# Patient Record
Sex: MALE | Race: WHITE | Employment: OTHER | URBAN - METROPOLITAN AREA
[De-identification: names, ages, dates, MRNs, and addresses within clinical notes are randomized per-mention and may not be internally consistent; named-entity substitution may affect disease eponyms.]

---

## 2017-02-21 ENCOUNTER — TELEPHONE (OUTPATIENT)
Dept: FAMILY MEDICINE CLINIC | Age: 67
End: 2017-02-21

## 2017-02-21 DIAGNOSIS — Z11.59 NEED FOR HEPATITIS C SCREENING TEST: ICD-10-CM

## 2017-02-21 DIAGNOSIS — E78.00 PURE HYPERCHOLESTEROLEMIA: Primary | ICD-10-CM

## 2017-02-21 RX ORDER — ATORVASTATIN CALCIUM 20 MG/1
20 TABLET, FILM COATED ORAL DAILY
Qty: 90 TABLET | Refills: 3 | Status: SHIPPED | OUTPATIENT
Start: 2017-02-21 | End: 2017-12-19 | Stop reason: SDUPTHER

## 2017-04-12 ENCOUNTER — TELEPHONE (OUTPATIENT)
Dept: FAMILY MEDICINE CLINIC | Age: 67
End: 2017-04-12

## 2017-06-03 ENCOUNTER — HOSPITAL ENCOUNTER (OUTPATIENT)
Dept: OTHER | Age: 67
Discharge: HOME | End: 2017-06-03
Attending: FAMILY MEDICINE | Admitting: FAMILY MEDICINE

## 2017-06-03 LAB
A/G RATIO: 1.8 (ref 1.1–2.2)
ALBUMIN SERPL-MCNC: 4.3 G/DL (ref 3.4–5)
ALP BLD-CCNC: 82 U/L (ref 40–129)
ALT SERPL-CCNC: 23 U/L (ref 10–40)
ANION GAP SERPL CALCULATED.3IONS-SCNC: 16 MMOL/L (ref 3–16)
AST SERPL-CCNC: 20 U/L (ref 15–37)
BILIRUB SERPL-MCNC: 0.4 MG/DL (ref 0–1)
BUN BLDV-MCNC: 23 MG/DL (ref 7–20)
CALCIUM SERPL-MCNC: 9 MG/DL (ref 8.3–10.6)
CHLORIDE BLD-SCNC: 104 MMOL/L (ref 99–110)
CHOLESTEROL, TOTAL: 157 MG/DL (ref 0–199)
CO2: 24 MMOL/L (ref 21–32)
CREAT SERPL-MCNC: 1 MG/DL (ref 0.8–1.3)
GFR AFRICAN AMERICAN: >60
GFR NON-AFRICAN AMERICAN: >60
GLOBULIN: 2.4 G/DL
GLUCOSE BLD-MCNC: 94 MG/DL (ref 70–99)
HDLC SERPL-MCNC: 51 MG/DL (ref 40–60)
HEPATITIS C ANTIBODY INTERPRETATION: NORMAL
LDL CHOLESTEROL CALCULATED: 87 MG/DL
POTASSIUM SERPL-SCNC: 4.7 MMOL/L (ref 3.5–5.1)
SODIUM BLD-SCNC: 144 MMOL/L (ref 136–145)
TOTAL PROTEIN: 6.7 G/DL (ref 6.4–8.2)
TRIGL SERPL-MCNC: 93 MG/DL (ref 0–150)
VLDLC SERPL CALC-MCNC: 19 MG/DL

## 2017-06-14 ENCOUNTER — OFFICE VISIT (OUTPATIENT)
Dept: FAMILY MEDICINE CLINIC | Age: 67
End: 2017-06-14

## 2017-06-14 VITALS
BODY MASS INDEX: 28.17 KG/M2 | HEART RATE: 82 BPM | DIASTOLIC BLOOD PRESSURE: 80 MMHG | SYSTOLIC BLOOD PRESSURE: 122 MMHG | OXYGEN SATURATION: 97 % | HEIGHT: 72 IN | WEIGHT: 208 LBS

## 2017-06-14 DIAGNOSIS — E78.00 PURE HYPERCHOLESTEROLEMIA: ICD-10-CM

## 2017-06-14 DIAGNOSIS — Z00.00 WELL ADULT EXAM: Primary | ICD-10-CM

## 2017-06-14 PROCEDURE — G0439 PPPS, SUBSEQ VISIT: HCPCS | Performed by: FAMILY MEDICINE

## 2017-06-14 ASSESSMENT — PATIENT HEALTH QUESTIONNAIRE - PHQ9
2. FEELING DOWN, DEPRESSED OR HOPELESS: 0
SUM OF ALL RESPONSES TO PHQ9 QUESTIONS 1 & 2: 0
SUM OF ALL RESPONSES TO PHQ QUESTIONS 1-9: 0
1. LITTLE INTEREST OR PLEASURE IN DOING THINGS: 0

## 2017-10-12 ENCOUNTER — OFFICE VISIT (OUTPATIENT)
Dept: FAMILY MEDICINE CLINIC | Age: 67
End: 2017-10-12

## 2017-10-12 VITALS
HEART RATE: 61 BPM | SYSTOLIC BLOOD PRESSURE: 132 MMHG | OXYGEN SATURATION: 97 % | DIASTOLIC BLOOD PRESSURE: 84 MMHG | WEIGHT: 217 LBS | BODY MASS INDEX: 29.23 KG/M2

## 2017-10-12 DIAGNOSIS — M54.50 ACUTE LEFT-SIDED LOW BACK PAIN WITHOUT SCIATICA: Primary | ICD-10-CM

## 2017-10-12 PROCEDURE — 3017F COLORECTAL CA SCREEN DOC REV: CPT | Performed by: FAMILY MEDICINE

## 2017-10-12 PROCEDURE — 99213 OFFICE O/P EST LOW 20 MIN: CPT | Performed by: FAMILY MEDICINE

## 2017-10-12 PROCEDURE — G8427 DOCREV CUR MEDS BY ELIG CLIN: HCPCS | Performed by: FAMILY MEDICINE

## 2017-10-12 PROCEDURE — G8417 CALC BMI ABV UP PARAM F/U: HCPCS | Performed by: FAMILY MEDICINE

## 2017-10-12 PROCEDURE — 1123F ACP DISCUSS/DSCN MKR DOCD: CPT | Performed by: FAMILY MEDICINE

## 2017-10-12 PROCEDURE — 1036F TOBACCO NON-USER: CPT | Performed by: FAMILY MEDICINE

## 2017-10-12 PROCEDURE — G8484 FLU IMMUNIZE NO ADMIN: HCPCS | Performed by: FAMILY MEDICINE

## 2017-10-12 PROCEDURE — 4040F PNEUMOC VAC/ADMIN/RCVD: CPT | Performed by: FAMILY MEDICINE

## 2017-12-19 RX ORDER — ATORVASTATIN CALCIUM 20 MG/1
TABLET, FILM COATED ORAL
Qty: 90 TABLET | Refills: 2 | Status: SHIPPED | OUTPATIENT
Start: 2017-12-19 | End: 2018-10-15 | Stop reason: SDUPTHER

## 2018-05-09 ENCOUNTER — TELEPHONE (OUTPATIENT)
Dept: FAMILY MEDICINE CLINIC | Age: 68
End: 2018-05-09

## 2018-05-09 DIAGNOSIS — E78.00 PURE HYPERCHOLESTEROLEMIA: Primary | ICD-10-CM

## 2018-06-16 ENCOUNTER — HOSPITAL ENCOUNTER (OUTPATIENT)
Dept: OTHER | Age: 68
Discharge: HOME | End: 2018-06-16
Attending: FAMILY MEDICINE | Admitting: FAMILY MEDICINE

## 2018-06-16 DIAGNOSIS — E78.00 PURE HYPERCHOLESTEROLEMIA: ICD-10-CM

## 2018-06-16 LAB
A/G RATIO: 1.6 (ref 1.1–2.2)
ALBUMIN SERPL-MCNC: 4.2 G/DL (ref 3.4–5)
ALP BLD-CCNC: 88 U/L (ref 40–129)
ALT SERPL-CCNC: 22 U/L (ref 10–40)
ANION GAP SERPL CALCULATED.3IONS-SCNC: 13 MMOL/L (ref 3–16)
AST SERPL-CCNC: 21 U/L (ref 15–37)
BILIRUB SERPL-MCNC: 0.4 MG/DL (ref 0–1)
BUN BLDV-MCNC: 19 MG/DL (ref 7–20)
CALCIUM SERPL-MCNC: 9.3 MG/DL (ref 8.3–10.6)
CHLORIDE BLD-SCNC: 106 MMOL/L (ref 99–110)
CHOLESTEROL, TOTAL: 144 MG/DL (ref 0–199)
CO2: 25 MMOL/L (ref 21–32)
CREAT SERPL-MCNC: 1 MG/DL (ref 0.8–1.3)
GFR AFRICAN AMERICAN: >60
GFR NON-AFRICAN AMERICAN: >60
GLOBULIN: 2.6 G/DL
GLUCOSE BLD-MCNC: 90 MG/DL (ref 70–99)
HDLC SERPL-MCNC: 50 MG/DL (ref 40–60)
LDL CHOLESTEROL CALCULATED: 81 MG/DL
POTASSIUM SERPL-SCNC: 4.7 MMOL/L (ref 3.5–5.1)
SODIUM BLD-SCNC: 144 MMOL/L (ref 136–145)
TOTAL PROTEIN: 6.8 G/DL (ref 6.4–8.2)
TRIGL SERPL-MCNC: 64 MG/DL (ref 0–150)
VLDLC SERPL CALC-MCNC: 13 MG/DL

## 2018-06-26 ENCOUNTER — OFFICE VISIT (OUTPATIENT)
Dept: FAMILY MEDICINE CLINIC | Age: 68
End: 2018-06-26

## 2018-06-26 VITALS
HEIGHT: 73 IN | HEART RATE: 67 BPM | BODY MASS INDEX: 28.34 KG/M2 | SYSTOLIC BLOOD PRESSURE: 122 MMHG | WEIGHT: 213.8 LBS | DIASTOLIC BLOOD PRESSURE: 84 MMHG | OXYGEN SATURATION: 97 %

## 2018-06-26 DIAGNOSIS — Z00.00 WELL ADULT EXAM: Primary | ICD-10-CM

## 2018-06-26 DIAGNOSIS — E78.00 PURE HYPERCHOLESTEROLEMIA: ICD-10-CM

## 2018-06-26 PROCEDURE — 4040F PNEUMOC VAC/ADMIN/RCVD: CPT | Performed by: FAMILY MEDICINE

## 2018-06-26 PROCEDURE — G0439 PPPS, SUBSEQ VISIT: HCPCS | Performed by: FAMILY MEDICINE

## 2018-06-26 ASSESSMENT — PATIENT HEALTH QUESTIONNAIRE - PHQ9
1. LITTLE INTEREST OR PLEASURE IN DOING THINGS: 0
SUM OF ALL RESPONSES TO PHQ QUESTIONS 1-9: 0
2. FEELING DOWN, DEPRESSED OR HOPELESS: 0
SUM OF ALL RESPONSES TO PHQ9 QUESTIONS 1 & 2: 0

## 2018-06-28 ENCOUNTER — TELEPHONE (OUTPATIENT)
Dept: FAMILY MEDICINE CLINIC | Age: 68
End: 2018-06-28

## 2018-10-15 RX ORDER — ATORVASTATIN CALCIUM 20 MG/1
TABLET, FILM COATED ORAL
Qty: 90 TABLET | Refills: 2 | Status: SHIPPED | OUTPATIENT
Start: 2018-10-15 | End: 2019-02-15 | Stop reason: SDUPTHER

## 2019-01-16 ENCOUNTER — TELEPHONE (OUTPATIENT)
Dept: FAMILY MEDICINE CLINIC | Age: 69
End: 2019-01-16

## 2019-02-15 RX ORDER — ATORVASTATIN CALCIUM 20 MG/1
TABLET, FILM COATED ORAL
Qty: 90 TABLET | Refills: 1 | Status: SHIPPED | OUTPATIENT
Start: 2019-02-15 | End: 2019-09-18 | Stop reason: SDUPTHER

## 2019-07-02 ENCOUNTER — TELEPHONE (OUTPATIENT)
Dept: FAMILY MEDICINE CLINIC | Age: 69
End: 2019-07-02
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
## 2019-09-18 RX ORDER — ATORVASTATIN CALCIUM 20 MG/1
TABLET, FILM COATED ORAL
Qty: 90 TABLET | Refills: 0 | Status: SHIPPED | OUTPATIENT
Start: 2019-09-18 | End: 2020-01-17 | Stop reason: SDUPTHER

## 2019-12-16 ENCOUNTER — TELEPHONE (OUTPATIENT)
Dept: FAMILY MEDICINE CLINIC | Age: 69
End: 2019-12-16

## 2019-12-16 DIAGNOSIS — E78.00 PURE HYPERCHOLESTEROLEMIA: Primary | ICD-10-CM

## 2020-01-11 ENCOUNTER — HOSPITAL ENCOUNTER (OUTPATIENT)
Age: 70
Discharge: HOME | End: 2020-01-11
Payer: MEDICARE

## 2020-01-11 LAB
A/G RATIO: 1.5 (ref 1.1–2.2)
ALBUMIN SERPL-MCNC: 4.3 G/DL (ref 3.4–5)
ALP BLD-CCNC: 85 U/L (ref 40–129)
ALT SERPL-CCNC: 31 U/L (ref 10–40)
ANION GAP SERPL CALCULATED.3IONS-SCNC: 16 MMOL/L (ref 3–16)
AST SERPL-CCNC: 27 U/L (ref 15–37)
BILIRUB SERPL-MCNC: 0.6 MG/DL (ref 0–1)
BUN BLDV-MCNC: 25 MG/DL (ref 7–20)
CALCIUM SERPL-MCNC: 9.3 MG/DL (ref 8.3–10.6)
CHLORIDE BLD-SCNC: 105 MMOL/L (ref 99–110)
CHOLESTEROL, TOTAL: 151 MG/DL (ref 0–199)
CO2: 23 MMOL/L (ref 21–32)
CREAT SERPL-MCNC: 1.3 MG/DL (ref 0.8–1.3)
GFR AFRICAN AMERICAN: >60
GFR NON-AFRICAN AMERICAN: 55
GLOBULIN: 2.8 G/DL
GLUCOSE BLD-MCNC: 91 MG/DL (ref 70–99)
HDLC SERPL-MCNC: 55 MG/DL (ref 40–60)
LDL CHOLESTEROL CALCULATED: 83 MG/DL
POTASSIUM SERPL-SCNC: 4.6 MMOL/L (ref 3.5–5.1)
SODIUM BLD-SCNC: 144 MMOL/L (ref 136–145)
TOTAL PROTEIN: 7.1 G/DL (ref 6.4–8.2)
TRIGL SERPL-MCNC: 64 MG/DL (ref 0–150)
VLDLC SERPL CALC-MCNC: 13 MG/DL

## 2020-01-11 PROCEDURE — 80053 COMPREHEN METABOLIC PANEL: CPT

## 2020-01-11 PROCEDURE — 36415 COLL VENOUS BLD VENIPUNCTURE: CPT

## 2020-01-11 PROCEDURE — 80061 LIPID PANEL: CPT

## 2020-01-17 ENCOUNTER — OFFICE VISIT (OUTPATIENT)
Dept: FAMILY MEDICINE CLINIC | Age: 70
End: 2020-01-17
Payer: MEDICARE

## 2020-01-17 VITALS
HEIGHT: 72 IN | BODY MASS INDEX: 29.12 KG/M2 | HEART RATE: 72 BPM | SYSTOLIC BLOOD PRESSURE: 118 MMHG | OXYGEN SATURATION: 98 % | WEIGHT: 215 LBS | DIASTOLIC BLOOD PRESSURE: 72 MMHG

## 2020-01-17 PROCEDURE — 3017F COLORECTAL CA SCREEN DOC REV: CPT | Performed by: FAMILY MEDICINE

## 2020-01-17 PROCEDURE — G0439 PPPS, SUBSEQ VISIT: HCPCS | Performed by: FAMILY MEDICINE

## 2020-01-17 PROCEDURE — 1123F ACP DISCUSS/DSCN MKR DOCD: CPT | Performed by: FAMILY MEDICINE

## 2020-01-17 PROCEDURE — G8482 FLU IMMUNIZE ORDER/ADMIN: HCPCS | Performed by: FAMILY MEDICINE

## 2020-01-17 PROCEDURE — 4040F PNEUMOC VAC/ADMIN/RCVD: CPT | Performed by: FAMILY MEDICINE

## 2020-01-17 RX ORDER — ATORVASTATIN CALCIUM 20 MG/1
TABLET, FILM COATED ORAL
Qty: 90 TABLET | Refills: 3 | Status: SHIPPED | OUTPATIENT
Start: 2020-01-17 | End: 2020-10-26

## 2020-01-17 SDOH — HEALTH STABILITY: MENTAL HEALTH: HOW OFTEN DO YOU HAVE A DRINK CONTAINING ALCOHOL?: MONTHLY OR LESS

## 2020-01-17 SDOH — HEALTH STABILITY: MENTAL HEALTH: HOW MANY STANDARD DRINKS CONTAINING ALCOHOL DO YOU HAVE ON A TYPICAL DAY?: 1 OR 2

## 2020-01-17 ASSESSMENT — PATIENT HEALTH QUESTIONNAIRE - PHQ9
SUM OF ALL RESPONSES TO PHQ QUESTIONS 1-9: 0
SUM OF ALL RESPONSES TO PHQ QUESTIONS 1-9: 0

## 2020-01-17 NOTE — PROGRESS NOTES
[facility] VISIT    Patient is here for their Medicare Annual Wellness Visit. Last eye exam: utd  Last dental exam: no  Exercise: walk every day about 1 mile and active through the day at work. Fall Risk 1/17/2020 6/26/2018 6/14/2017 4/27/2015   2 or more falls in past year? no no no no   Fall with injury in past year? no no no no       PHQ Scores 1/17/2020 6/26/2018 6/14/2017 4/27/2015   PHQ2 Score 0 0 0 0   PHQ9 Score 0 0 0 0         Have you noted any problems with hearing?: No  Have you noted any vision problems?: No    Do you take opioid medications even sometimes? No (if using assess risk and whether other treatments would be beneficial)    Living Will: Yes,   Copy requested    Do you need help with:  Using the phone:  No  Bathing: No  Dressing:  No  Toileting: No  Transportation:  No  Shopping: No  Preparing meals: No  Housework/Laundry: No  Medications: No  Money management: No    Does your home have:  Unsecured throw rugs: No  Grab bars in bathroom: Yes  Walk in shower: Yes  Seat in shower: Yes  Lit pathways for night (nightlights): Yes    Memory:  Have you or anyone close to you expressed concerns about your memory: No, working on application developments for computer programming. Knows:  Month: Yes  Day: Yes  Year: Yes  Day of Week: Yes  Able to Recall (apple, boat, jose) : Yes    Patient history:   Patient's medications, allergies, past medical, surgical, social and family histories were reviewed and updated in the EHR. Care Team:  Patient's list of care team members was updated in EHR. Immunizations: up to date and documented    Health Maintenance Due   Topic Date Due    Annual Wellness Visit (AWV)  06/20/2019    DTaP/Tdap/Td vaccine (2 - Td) 07/17/2019       CHRONIC CONDITIONS   Pt has been following Dr. Judy Weathers Dermatologist, was last seen in 2018 for mole removal on the left foot. Physical Exam:    Body mass index is 29.36 kg/m².   Vitals:    01/17/20 0848   BP: 118/72   Site: Left Upper Arm   Position: Sitting   Cuff Size: Medium Adult   Pulse: 72   SpO2: 98%   Weight: 215 lb (97.5 kg)   Height: 5' 11.75\" (1.822 m)     Wt Readings from Last 3 Encounters:   01/17/20 215 lb (97.5 kg)   06/26/18 213 lb 12.8 oz (97 kg)   10/12/17 217 lb (98.4 kg)       GENERAL:Alert and oriented x 4 NAD, affect appropriate and overweight, well hydrated, well developed. NECK:supple and non tender without mass, no thyromegaly or thyroid nodules, no cervical lymphadenopathy  LUNG:clear to auscultation bilaterally with normal respiratory effort  CV: Normal heart sounds, regular rate and rhythm without murmurs  EXTREMETY: no loss of hair, no edema, normal pedal pulses bilaterally    Was the timed get up and go unsteady or longer than 30 seconds: No    Vision Screen for Initial Exam: no    EKG for Initial Exam at 71 (): no    AAA U/S screen for men 65-75 who smoked (): not applicable    Assessment/Plan:    Don Allison was seen today for medicare awv. Diagnoses and all orders for this visit:    Well adult exam  Recommended screenings discussed and ordered if patient agreed  Recommended vaccinations discussed and ordered if patient agreed  Encouraged healthy diet   Encouraged regular exercise and maintaining a healthy weight  Medicare Safety Interventions: Home safety tips provided  Individualized [address] [place] [address] Avenue included in patient instructions and AVS    Pure hypercholesterolemia  -Stable, continue current medications. Reviewed recent labs with patient. CKD (chronic kidney disease) stage 3, GFR 30-59 ml/min ([facility])  -     Basic Metabolic Panel; Future  Slight decline  Closer f/u   Recheck 3 months    Other orders  -     atorvastatin (LIPITOR) 20 MG tablet; TAKE 1 TABLET DAILY            Return in about 1 year (around 1/17/2021) for AMW  30 min.              Scribe attestation: Padmini HAWKINS, am scribing for and in the presence of Marisol Underwood MD. Electronically signed by DESIRE Valentine on 1/17/20 at 9:12 AM    Note per DESIRE Valentine and Scribe with corrections and edits per Vangie Woods MD.  I agree with entirety of note and was present and performed history and physical.  I also confirm that the note above accurately reflects all work, treatment, procedures, and medical decision making performed by me, Vangie Woods MD

## 2020-01-17 NOTE — PATIENT INSTRUCTIONS
Tdap (tetanus/ pertussis) vaccine. Well Visit, Over 72: Care Instructions  Your Care Instructions    Physical exams can help you stay healthy. Your doctor has checked your overall health and may have suggested ways to take good care of yourself. He or she also may have recommended tests. At home, you can help prevent illness with healthy eating, regular exercise, and other steps. Follow-up care is a key part of your treatment and safety. Be sure to make and go to all appointments, and call your doctor if you are having problems. It's also a good idea to know your test results and keep a list of the medicines you take. How can you care for yourself at home? · Reach and stay at a healthy weight. This will lower your risk for many problems, such as obesity, diabetes, heart disease, and high blood pressure. · Get at least 30 minutes of exercise on most days of the week. Walking is a good choice. You also may want to do other activities, such as running, swimming, cycling, or playing tennis or team sports. · Do not smoke. Smoking can make health problems worse. If you need help quitting, talk to your doctor about stop-smoking programs and medicines. These can increase your chances of quitting for good. · Protect your skin from too much sun. When you're outdoors from 10 a.m. to 4 p.m., stay in the shade or cover up with clothing and a hat with a wide brim. Wear sunglasses that block UV rays. Even when it's cloudy, put broad-spectrum sunscreen (SPF 30 or higher) on any exposed skin. · See a dentist one or two times a year for checkups and to have your teeth cleaned. · Wear a seat belt in the car. Follow your doctor's advice about when to have certain tests. These tests can spot problems early. For men and women  · Cholesterol. Your doctor will tell you how often to have this done based on your overall health and other things that can increase your risk for heart attack and stroke. · Blood pressure.  Have lower body to the wall. Push body away from wall to return to starting position. Chair Squats  Begin by sitting in the chair. Lean slightly forward and stand up from the chair. Try not to favor one side or use your hands to help you. Bicep Curls  Hold a weight in each hand with your arms at your sides. Bending your arms at the elbows, lift the weights to your shoulders and then lower them to your sides. Shoulder Shrugs  Hold a weight in each hand with your arms at your side. Shrug your shoulders up toward your ears and then lower them back down. Citations  Promoting and Prescribing Exercise for the Elderly by Sally Julian M.D., and Angel Benavidez, Ph.D.(02/01/02, http://www. aafp.org/afp/03673278/419.html)    Last Updated: January 2011  This article was contributed by: familydoctor. org editorial staff  Copyright © American Academy [employer]  This information provides a general overview and may not apply to everyone. Talk to your family doctor to find out if this information applies to you and to get more information on this subject. Advance Directives, DNR, and MOLST    Decision making at the end of life is difficult for patients, families and health care providers. Since the early 1990s, a number of forms have been developed to help people express their wishes in advance. What are \"advance directives\"? Advance directives are documents that can help you remain in charge of your health care even after you can no longer make decisions for yourself. The two most common forms of written advance directives are the living will and durable power of  for healthcare. Some people seek an s services to complete these documents; however this is not required. You can complete these documents yourself and have them either notarized or witnessed by two people who are over 25 and not related to you by blood or marriage. What is a \"living will\"?      A living will is a document that tells authorized prescriber. It addresses the various methods used to revive people whose hearts have stopped beating /or who have stopped breathing. If a person has a [facility] order, he will receive care that eases pain and suffering but no cardio-pulmonary resuscitation (CPR) to save or prolong life. The [facility] becomes active as soon as it is signed by the doctor or advanced practice nurse. The [facility] is a standard form which can be obtained from the [address] [facility] or healthcare facilities. What is DNR Comfort Care - Arrest (a.k.a. [address])? The [address] is similar to the [facility] but it only becomes active if and when the person has a cardiac and/or respiratory arrest (i.e. the person stops breathing or his heart stops beating). The [address] is a standard form which can be obtained from the [address] [facility] or healthcare facilities. What is a MOLST? MOLST stands for Medical Orders for Life Sustaining Treatment. Nanette Falling is a medical order which specifies different treatment options for individuals who are seriously ill or frail and elderly. The MOLST allows patients or their surrogate to discuss care options they do and do not want to receive at the end of life, and then have their physician or other prescriber convey them into orders. This form would be available through [address] and healthcare facilities. Who is at high risk of falling? Anyone can fall, although the risk is higher in older people. This increased risk of falling may be the result of changes that come with aging, and certain medical conditions, such as arthritis, cataracts or hip problems. What can I do to lower my risk of falling? Most falls happen in the home. Consider the following tips to make your home safe:  -Make sure that you have good lighting in your home. A well lit home will help you avoid tripping over objects that are not easy to see.  Put night lights may be the result of changes that come with aging, and certain medical conditions, such as arthritis, cataracts or hip problems. What can I do to lower my risk of falling? Most falls happen in the home. Consider the following tips to make your home safe:  -Make sure that you have good lighting in your home. A well lit home will help you avoid tripping over objects that are not easy to see. Put night lights in your bedroom, hallways, stairs and bathrooms.  -Rugs should be firmly fastened to the floor or have nonskid backing. Loose ends should be tacked down.  -Electrical cords should not be lying on the floor in walking areas.  -Put hand rails in your bathroom for bath, shower and toilet use. -Have rails on both sides of your stairs for support.  -In the kitchen, make sure items are within easy reach. Dont store things too high or too low. Then you wont have to use a stepladder or a stool to reach them. Its also a good idea to avoid storing things too low, so you wont have to bend down to get them.  -Wear shoes with firm nonskid soles. Avoid wearing loose-fitting slippers that could cause you to trip. What else can I do? Take good care of your body. Try to stay healthy by following these tips:  -See your eye doctor once a year. Cataracts and other eye diseases that cause you not to see well, can lead to falls. -Get regular physical activity to keep your bones and muscles strong.  -Take good care of your feet. If you have pain in your feet or if you have large, thick nails and corns, have your doctor look at your feet. -Talk to your doctor about any side effects you may have from your medicines. Problems caused by side effects from medicine are a common cause of falls. The more medicines you take, the greater your risk of falling.  -Talk to your doctor if you have dizzy spells.  -If your doctor suggests that you use a cane or a walker to help you walk, be sure to use it.  This will give you extra

## 2020-04-03 DIAGNOSIS — N18.30 CKD (CHRONIC KIDNEY DISEASE) STAGE 3, GFR 30-59 ML/MIN (HCC): ICD-10-CM

## 2020-04-03 LAB
ANION GAP SERPL CALCULATED.3IONS-SCNC: 13 MMOL/L (ref 3–16)
BUN BLDV-MCNC: 15 MG/DL (ref 7–20)
CALCIUM SERPL-MCNC: 9.1 MG/DL (ref 8.3–10.6)
CHLORIDE BLD-SCNC: 102 MMOL/L (ref 99–110)
CO2: 27 MMOL/L (ref 21–32)
CREAT SERPL-MCNC: 1.1 MG/DL (ref 0.8–1.3)
GFR AFRICAN AMERICAN: >60
GFR NON-AFRICAN AMERICAN: >60
GLUCOSE BLD-MCNC: 85 MG/DL (ref 70–99)
POTASSIUM SERPL-SCNC: 4.6 MMOL/L (ref 3.5–5.1)
SODIUM BLD-SCNC: 142 MMOL/L (ref 136–145)

## 2020-10-26 RX ORDER — ATORVASTATIN CALCIUM 20 MG/1
TABLET, FILM COATED ORAL
Qty: 90 TABLET | Refills: 1 | Status: SHIPPED | OUTPATIENT
Start: 2020-10-26 | End: 2021-01-25 | Stop reason: SDUPTHER

## 2021-01-25 RX ORDER — ATORVASTATIN CALCIUM 20 MG/1
20 TABLET, FILM COATED ORAL DAILY
Qty: 90 TABLET | Refills: 1 | Status: SHIPPED | OUTPATIENT
Start: 2021-01-25 | End: 2021-04-15 | Stop reason: SDUPTHER

## 2021-01-25 NOTE — TELEPHONE ENCOUNTER
Medication:   Requested Prescriptions      No prescriptions requested or ordered in this encounter          Patient Phone Number: [phone] (home) [phone] (work)    Last appt: 1/17/2020   Next appt: Visit date not found    Last OARRS: No flowsheet data found.   PDMP Monitoring:    Last PDMP [facility] as Reviewed [facility]):  Review User Review Instant Review Result          Preferred Pharmacy:   [place] [address], [address] [place] [phone] -  [phone]  [place] [address]  Phone: [phone] Fax: [phone]

## 2021-02-05 ENCOUNTER — TELEPHONE (OUTPATIENT)
Dept: ADMINISTRATIVE | Age: 71
End: 2021-02-05

## 2021-03-09 ENCOUNTER — TELEPHONE (OUTPATIENT)
Dept: FAMILY MEDICINE CLINIC | Age: 71
End: 2021-03-09

## 2021-03-09 DIAGNOSIS — E78.00 PURE HYPERCHOLESTEROLEMIA: Primary | ICD-10-CM

## 2021-03-09 NOTE — TELEPHONE ENCOUNTER
----- Message from Melvin Bonilla sent at 3/9/2021  3:48 PM EST -----  Subject: Message to Provider    QUESTIONS  Information for Provider? Patient has appointment 4/15/21 with Dr Steph Grubbs on   4/15 and wants to have his labs done prior to   please advise.   ---------------------------------------------------------------------------  --------------  CALL BACK INFO  What is the best way for the office to contact you? OK to leave message on   voicemail  Preferred Call Back Phone Number? [phone]  ---------------------------------------------------------------------------  --------------  SCRIPT ANSWERS  Relationship to Patient?  Self

## 2021-03-12 ENCOUNTER — TELEPHONE (OUTPATIENT)
Dept: FAMILY MEDICINE CLINIC | Age: 71
End: 2021-03-12

## 2021-03-12 NOTE — TELEPHONE ENCOUNTER
Patient wants to know if Dr Maurice Cullen would like him to do a cologuard before his appt on 4/15.       Please advise

## 2021-04-03 ENCOUNTER — HOSPITAL ENCOUNTER (OUTPATIENT)
Age: 71
Discharge: HOME | End: 2021-04-03
Payer: MEDICARE

## 2021-04-03 DIAGNOSIS — E78.00 PURE HYPERCHOLESTEROLEMIA: ICD-10-CM

## 2021-04-03 LAB
A/G RATIO: 1.4 (ref 1.1–2.2)
ALBUMIN SERPL-MCNC: 4.2 G/DL (ref 3.4–5)
ALP BLD-CCNC: 105 U/L (ref 40–129)
ALT SERPL-CCNC: 20 U/L (ref 10–40)
ANION GAP SERPL CALCULATED.3IONS-SCNC: 9 MMOL/L (ref 3–16)
AST SERPL-CCNC: 21 U/L (ref 15–37)
BILIRUB SERPL-MCNC: 0.4 MG/DL (ref 0–1)
BUN BLDV-MCNC: 17 MG/DL (ref 7–20)
CALCIUM SERPL-MCNC: 9.3 MG/DL (ref 8.3–10.6)
CHLORIDE BLD-SCNC: 104 MMOL/L (ref 99–110)
CHOLESTEROL, TOTAL: 155 MG/DL (ref 0–199)
CO2: 28 MMOL/L (ref 21–32)
CREAT SERPL-MCNC: 1.2 MG/DL (ref 0.8–1.3)
GFR AFRICAN AMERICAN: >60
GFR NON-AFRICAN AMERICAN: 60
GLOBULIN: 2.9 G/DL
GLUCOSE BLD-MCNC: 93 MG/DL (ref 70–99)
HDLC SERPL-MCNC: 49 MG/DL (ref 40–60)
LDL CHOLESTEROL CALCULATED: 94 MG/DL
POTASSIUM SERPL-SCNC: 4.6 MMOL/L (ref 3.5–5.1)
SODIUM BLD-SCNC: 141 MMOL/L (ref 136–145)
TOTAL PROTEIN: 7.1 G/DL (ref 6.4–8.2)
TRIGL SERPL-MCNC: 62 MG/DL (ref 0–150)
VLDLC SERPL CALC-MCNC: 12 MG/DL

## 2021-04-03 PROCEDURE — 80061 LIPID PANEL: CPT

## 2021-04-03 PROCEDURE — 36415 COLL VENOUS BLD VENIPUNCTURE: CPT

## 2021-04-03 PROCEDURE — 80053 COMPREHEN METABOLIC PANEL: CPT

## 2021-04-15 ENCOUNTER — OFFICE VISIT (OUTPATIENT)
Dept: FAMILY MEDICINE CLINIC | Age: 71
End: 2021-04-15
Payer: MEDICARE

## 2021-04-15 VITALS
DIASTOLIC BLOOD PRESSURE: 84 MMHG | BODY MASS INDEX: 30.09 KG/M2 | WEIGHT: 227 LBS | HEIGHT: 73 IN | SYSTOLIC BLOOD PRESSURE: 118 MMHG | OXYGEN SATURATION: 97 % | HEART RATE: 83 BPM

## 2021-04-15 DIAGNOSIS — Z00.00 WELL ADULT EXAM: Primary | ICD-10-CM

## 2021-04-15 DIAGNOSIS — E78.00 PURE HYPERCHOLESTEROLEMIA: ICD-10-CM

## 2021-04-15 DIAGNOSIS — H61.22 IMPACTED CERUMEN OF LEFT EAR: ICD-10-CM

## 2021-04-15 DIAGNOSIS — Z12.11 SCREEN FOR COLON CANCER: ICD-10-CM

## 2021-04-15 PROCEDURE — 1123F ACP DISCUSS/DSCN MKR DOCD: CPT | Performed by: FAMILY MEDICINE

## 2021-04-15 PROCEDURE — G0439 PPPS, SUBSEQ VISIT: HCPCS | Performed by: FAMILY MEDICINE

## 2021-04-15 PROCEDURE — 3017F COLORECTAL CA SCREEN DOC REV: CPT | Performed by: FAMILY MEDICINE

## 2021-04-15 PROCEDURE — 90471 IMMUNIZATION ADMIN: CPT | Performed by: FAMILY MEDICINE

## 2021-04-15 PROCEDURE — 90715 TDAP VACCINE 7 YRS/> IM: CPT | Performed by: FAMILY MEDICINE

## 2021-04-15 PROCEDURE — 4040F PNEUMOC VAC/ADMIN/RCVD: CPT | Performed by: FAMILY MEDICINE

## 2021-04-15 RX ORDER — ATORVASTATIN CALCIUM 20 MG/1
20 TABLET, FILM COATED ORAL DAILY
Qty: 90 TABLET | Refills: 3 | Status: SHIPPED | OUTPATIENT
Start: 2021-04-15 | End: 2022-04-19 | Stop reason: SDUPTHER

## 2021-04-15 RX ORDER — ATORVASTATIN CALCIUM 20 MG/1
20 TABLET, FILM COATED ORAL DAILY
Qty: 90 TABLET | Refills: 3 | Status: SHIPPED | OUTPATIENT
Start: 2021-04-15 | End: 2021-04-15 | Stop reason: SDUPTHER

## 2021-04-15 ASSESSMENT — PATIENT HEALTH QUESTIONNAIRE - PHQ9
1. LITTLE INTEREST OR PLEASURE IN DOING THINGS: 0
SUM OF ALL RESPONSES TO PHQ QUESTIONS 1-9: 0

## 2021-04-15 NOTE — PATIENT INSTRUCTIONS
Patient Education   Please call the office noted below to schedule an appointment for colonoscopy    [facility] and [address]  [address] [facility], [address], [address]  Phone: [phone]     Well Visit, Over 72: Care Instructions  Overview     Well visits can help you stay healthy. Your doctor has checked your overall health and may have suggested ways to take good care of yourself. Your doctor also may have recommended tests. At home, you can help prevent illness with healthy eating, regular exercise, and other steps. Follow-up care is a key part of your treatment and safety. Be sure to make and go to all appointments, and call your doctor if you are having problems. It's also a good idea to know your test results and keep a list of the medicines you take. How can you care for yourself at home? · Get screening tests that you and your doctor decide on. Screening helps find diseases before any symptoms appear. · Eat healthy foods. Choose fruits, vegetables, whole grains, protein, and low-fat dairy foods. Limit fat, especially saturated fat. Reduce salt in your diet. · Limit alcohol. If you are a man, have no more than 2 drinks a day or 14 drinks a week. If you are a woman, have no more than 1 drink a day or 7 drinks a week. Since alcohol affects older adults differently, you may want to limit alcohol even more. Or you may not want to drink at all. · Get at least 30 minutes of exercise on most days of the week. Walking is a good choice. You also may want to do other activities, such as running, swimming, cycling, or playing tennis or team sports. · Reach and stay at a healthy weight. This will lower your risk for many problems, such as obesity, diabetes, heart disease, and high blood pressure. · Do not smoke. Smoking can make health problems worse. If you need help quitting, talk to your doctor about stop-smoking programs and medicines.  These can increase your chances of quitting for good. · Care for your mental health. It is easy to get weighed down by worry and stress. Learn strategies to manage stress, like deep breathing and mindfulness, and stay connected with your family and community. If you find you often feel sad or hopeless, talk with your doctor. Treatment can help. · Talk to your doctor about whether you have any risk factors for sexually transmitted infections (STIs). You can help prevent STIs if you wait to have sex with a new partner (or partners) until you've each been tested for STIs. It also helps if you use condoms (male or female condoms) and if you limit your sex partners to one person who only has sex with you. Vaccines are available for some STIs. · If you think you may have a problem with alcohol or drug use, talk to your doctor. This includes prescription medicines (such as amphetamines and opioids) and illegal drugs (such as cocaine and methamphetamine). Your doctor can help you figure out what type of treatment is best for you. · Protect your skin from too much sun. When you're outdoors from 10 a.m. to 4 p.m., stay in the shade or cover up with clothing and a hat with a wide brim. Wear sunglasses that block UV rays. Even when it's cloudy, put broad-spectrum sunscreen (SPF 30 or higher) on any exposed skin. · See a dentist one or two times a year for checkups and to have your teeth cleaned. · Wear a seat belt in the car. When should you call for help? Watch closely for changes in your health, and be sure to contact your doctor if you have any problems or symptoms that concern you. Where can you learn more? Go to [email or web address] org and sign in to your [employer] account. Enter G560 in the Cyrba box to learn more about \"Well Visit, Over 65: Care Instructions. \"     If you do not have an account, please click on the \"Sign Up Now\" link.   Current as of: May 27, 2020               Content Version: 12.8  © 5260-3407 Healthwise, Incorporated. Care instructions adapted under license by [facility] ([facility]). If you have questions about a medical condition or this instruction, always ask your healthcare professional. [address] any warranty or liability for your use of this information. Exercise and Seniors  Is it safe for me to exercise? It is safe for most adults older than 72years of age to exercise. Even patients who have chronic illnesses such as heart disease, high blood pressure, diabetes and arthritis can exercise safely. Many of these conditions are improved with exercise. If you are not sure if exercise is safe for you or if you are currently inactive, ask your doctor. How do I get started? It is important to wear loose, comfortable clothing and well-fitting, sturdy shoes. Your shoes should have a good arch support, and an elevated and cushioned heel to absorb shock. If you are not already active, begin slowly. Start with exercises that you are already comfortable doing. Starting slowly makes it less likely that you will injure yourself. Starting slowly also helps prevent soreness. The saying no pain, no gain is not true for older or elderly adults. You do not have to exercise at a high intensity to get most health benefits. For example, walking is an excellent activity to start with. As you become used to exercising, or if you are already active, you can slowly increase the intensity of your exercise program.    What type of exercise should I do? There are several types of exercise that you should do. You will want to do some type of aerobic activity for at least 30 minutes on most days of the week. Examples are walking, swimming and bicycling. You should also do resistance (also called strength training) 2 days per week. Warm up for 5 minutes before each exercise session. Walking slowly and then stretching are good warm-up activities.  You should also cool down with more stretching for 5 minutes when you finish exercising. Cool down longer in warmer weather. Exercise is only good for you if you are feeling well. Wait to exercise until you feel better if you have a cold, the flu or another illness. If you miss exercise for more  than 2 weeks, be sure to start slowly again. When should I call my doctor? If your muscles or joints are sore the day after exercising, you may have done too much. Next time, exercise at a lower intensity. If the pain or discomfort persists, you should talk to your doctor. You should also talk to your doctor if you have any of the following symptoms while exercising:  -Chest pain or pressure  -Trouble breathing or excessive shortness of breath  -lightheadedness or dizziness  -difficulty with balance  -nausea    What are some specific exercises I can do? The following shows some simple strength exercises that you can do at home. Each exercise should be done 8 to 10 times for 2 sets. Remember to:   -Complete all movements in a slow, controlled fashion  -Dont hold your breath  -Stop if you feel pain   -Stretch each muscle after your workout. Wall Pushups  Place hands flat against the wall. Slowly lower body to the wall. Push body away from wall to return to starting position. Chair Squats  Begin by sitting in the chair. Lean slightly forward and stand up from the chair. Try not to favor one side or use your hands to help you. Bicep Curls  Hold a weight in each hand with your arms at your sides. Bending your arms at the elbows, lift the weights to your shoulders and then lower them to your sides. Shoulder Shrugs  Hold a weight in each hand with your arms at your side. Shrug your shoulders up toward your ears and then lower them back down. Citations  Promoting and Prescribing Exercise for the Elderly by Niurka Roldan M.D., and Antoni Altamirano, Ph.D.(02/01/02, http://www. aafp.org/afp/[email or web address].html)    Last Updated: January 2011  This article was contributed by: [email or web address]. org editorial staff  Copyright © American Academy of [employer]icians  This information provides a general overview and may not apply to everyone. Talk to your family doctor to find out if this information applies to you and to get more information on this subject. Patient Education        Preventing Falls: Care Instructions  Your Care Instructions     Getting around your home safely can be a challenge if you have injuries or health problems that make it easy for you to fall. Loose rugs and furniture in walkways are among the dangers for many older people who have problems walking or who have poor eyesight. People who have conditions such as arthritis, osteoporosis, or dementia also have to be careful not to fall. You can make your home safer with a few simple measures. Follow-up care is a key part of your treatment and safety. Be sure to make and go to all appointments, and call your doctor if you are having problems. It's also a good idea to know your test results and keep a list of the medicines you take. How can you care for yourself at home? Taking care of yourself  · You may get dizzy if you do not drink enough water. To prevent dehydration, drink plenty of fluids. Choose water and other caffeine-free clear liquids. If you have kidney, heart, or liver disease and have to limit fluids, talk with your doctor before you increase the amount of fluids you drink. · Exercise regularly to improve your strength, muscle tone, and balance. Walk if you can. Swimming may be a good choice if you cannot walk easily. · Have your vision and hearing checked each year or any time you notice a change. If you have trouble seeing and hearing, you might not be able to avoid objects and could lose your balance. · Know the side effects of the medicines you take. Ask your doctor or pharmacist whether the medicines you take can affect your balance. Sleeping pills or sedatives can affect your balance.   · Limit the amount of alcohol you drink. Alcohol can impair your balance and other senses. · Ask your doctor whether calluses or corns on your feet need to be removed. If you wear loose-fitting shoes because of calluses or corns, you can lose your balance and fall. · Talk to your doctor if you have numbness in your feet. Preventing falls at home  · Remove raised doorway thresholds, throw rugs, and clutter. Repair loose carpet or raised areas in the floor. · Move furniture and electrical cords to keep them out of walking paths. · Use nonskid floor wax, and wipe up spills right away, especially on ceramic tile floors. · If you use a walker or cane, put rubber tips on it. If you use crutches, clean the bottoms of them regularly with an abrasive pad, such as steel wool. · Keep your house well lit, especially [place], and outside walkways. Use night-lights in areas such as hallways and bathrooms. Add extra light switches or use remote switches (such as switches that go on or off when you clap your hands) to make it easier to turn lights on if you have to get up during the night. · Install sturdy handrails on stairways. · Move items in your cabinets so that the things you use a lot are on the lower shelves (about waist level). · Keep a cordless phone and a flashlight with new batteries by your bed. If possible, put a phone in each of the main rooms of your house, or carry a cell phone in case you fall and cannot reach a phone. Or, you can wear a device around your neck or wrist. You push a button that sends a signal for help. · Wear low-heeled shoes that fit well and give your feet good support. Use footwear with nonskid soles. Check the heels and soles of your shoes for wear. Repair or replace worn heels or soles. · Do not wear socks without shoes on wood floors. · Walk on the grass when the sidewalks are slippery.  If you live in an area that gets snow and ice in the winter, sprinkle salt on slippery steps decisions for yourself. The two most common forms of written advance directives are the living will and durable power of  for healthcare. Some people seek an s services to complete these documents; however this is not required. You can complete these documents yourself and have them either notarized or witnessed by two people who are over 25 and not related to you by blood or marriage. What is a \"living will\"? A living will is a document that tells your doctor how you want to be treated if when you are determined to be terminally ill or permanently unconscious and you cannot make decisions for yourself. You can use a living will if you want to avoid life-prolonging treatments such as cardiopulmonary resuscitation (CPR), kidney dialysis or breathing machines. You can use your living will to tell your doctor that you just want to be pain free at the end of our life. In [place], the living will is sometimes called a \"declaration\". A living will form can be obtained from attorneys, [employer], and healthcare facilities. This signed form must be notarized or witnessed. What is a \"durable power of  for healthcare\"? A medical power of  (medical POA) is another type of advance directive that allows you to name a person to make health care decisions for you if and when you become unable to make them for yourself. The person you name to make decisions on your behalf is some times called your health care surrogate, agent, proxy or -in-fact. The person who holds your medical POA can respond to medical situations you might not have anticipated and make decisions for you empowered by knowledge of your values and wishes. The medical POA form can be obtained from [employer], [employer], and healthcare facilities. This signed form must be notarized or witnessed.  The medical POA document is different from the power of  form that authorizes someone to make financial transactions for you. What is cardiopulmonary resuscitation (CPR)? CPR is a technique useful in many emergencies, including heart attack or near drowning, in which someone's breathing or heartbeat has stopped. CPR may include chest compression, mouth-to-mouth or other rescue breathing and/or electric shock. What is a DNR -Comfort Care form (a.k.a. [facility])? DNR means do not resuscitate. A DNR is a medical order given by a physician or other legally authorized prescriber. It addresses the various methods used to revive people whose hearts have stopped beating /or who have stopped breathing. If a person has a [facility] order, he will receive care that eases pain and suffering but no cardio-pulmonary resuscitation (CPR) to save or prolong life. The [facility] becomes active as soon as it is signed by the doctor or advanced practice nurse. The [facility] is a standard form which can be obtained from the [address] [facility] or healthcare facilities. What is DNR Comfort Care - Arrest (a.k.a. [address])? The [address] is similar to the [facility] but it only becomes active if and when the person has a cardiac and/or respiratory arrest (i.e. the person stops breathing or his heart stops beating). The [address] is a standard form which can be obtained from the [address] [facility] or healthcare facilities. What is a MOLST? MOLST stands for Medical Orders for Life Sustaining Treatment. Nila Cunninghamel is a medical order which specifies different treatment options for individuals who are seriously ill or frail and elderly. The MOLST allows patients or their surrogate to discuss care options they do and do not want to receive at the end of life, and then have their physician or other prescriber convey them into orders. This form would be available through [address] and healthcare facilities.                  Patient Education        Tdap (Tetanus, Diphtheria, Pertussis) Vaccine: What You Need to Know  Why get vaccinated? Tdap vaccine can prevent tetanus, diphtheria, and pertussis. Diphtheria and pertussis spread from person to person. Tetanus enters the body through cuts or wounds. · TETANUS (T) causes painful stiffening of the muscles. Tetanus can lead to serious health problems, including being unable to open the mouth, having trouble swallowing and breathing, or death. · DIPHTHERIA (D) can lead to difficulty breathing, heart failure, paralysis, or death. · PERTUSSIS (aP), also known as \"whooping cough,\" can cause uncontrollable, violent coughing which makes it hard to breathe, eat, or drink. Pertussis can be extremely serious in babies and young children, causing pneumonia, convulsions, brain damage, or death. In teens and adults, it can cause weight loss, loss of bladder control, passing out, and rib fractures from severe coughing. Tdap vaccine  Tdap is only for children 7 years and older, adolescents, and adults. Adolescents should receive a single dose of Tdap, preferably at age 6 or 15 years. Pregnant women should get a dose of Tdap during every pregnancy, to protect the  from pertussis. Infants are most at risk for severe, life threatening complications from pertussis. Adults who have never received Tdap should get a dose of Tdap. Also, adults should receive a booster dose every 10 years, or earlier in the case of a severe and dirty wound or burn. Booster doses can be either Tdap or Td (a different vaccine that protects against tetanus and diphtheria but not pertussis). Tdap may be given at the same time as other vaccines. Talk with your health care provider  Tell your vaccine provider if the person getting the vaccine:  · Has had an allergic reaction after a previous dose of any vaccine that protects against tetanus, diphtheria, or pertussis, or has any severe, life threatening allergies.   · Has had a coma, decreased level of consciousness, or prolonged seizures Compensation Program (VICP) is a federal program that was created to compensate people who may have been injured by certain vaccines. Visit the VICP website at www.hrsa.gov/vaccinecompensation or call 5-618.845.8865 to learn about the program and about filing a claim. There is a time limit to file a claim for compensation. How can I learn more? · Ask your health care provider. · Call your local or state health department. · Contact the Centers for Disease Control and Prevention (CDC):  ? Call 1-238.805.7144 (2-707-GYL-INFO) or  ? Visit CDC's website at www.cdc.gov/vaccines  Vaccine Information Statement (Interim)  Tdap (Tetanus, Diphtheria, Pertussis) Vaccine  04/01/2020  [address] [id number]  Department of Health and Human Services  Centers for Disease Control and Prevention  Many Vaccine Information Statements are available in [place] and other languages. See www.immunize.org/vis. Muchas hojas de información sobre vacunas están disponibles en español y en otros idiomas. Visite www.immunize.org/vis. Care instructions adapted under license by [facility] ([facility]). If you have questions about a medical condition or this instruction, always ask your healthcare professional. [place] [address] any warranty or liability for your use of this information. FALL PREVENTION TIPS    Who is at high risk of falling? Anyone can fall, although the risk is higher in older people. This increased risk of falling may be the result of changes that come with aging, and certain medical conditions, such as arthritis, cataracts or hip problems. What can I do to lower my risk of falling? Most falls happen in the home. Consider the following tips to make your home safe:  -Make sure that you have good lighting in your home. A well lit home will help you avoid tripping over objects that are not easy to see.  Put night lights in your bedroom, hallways, stairs and bathrooms.  -Rugs should be firmly fastened to the floor or have nonskid backing. Loose ends should be tacked down.  -Electrical cords should not be lying on the floor in walking areas.  -Put hand rails in your bathroom for bath, shower and toilet use. -Have rails on both sides of your stairs for support.  -In the kitchen, make sure items are within easy reach. Dont store things too high or too low. Then you wont have to use a stepladder or a stool to reach them. Its also a good idea to avoid storing things too low, so you wont have to bend down to get them.  -Wear shoes with firm nonskid soles. Avoid wearing loose-fitting slippers that could cause you to trip. What else can I do? Take good care of your body. Try to stay healthy by following these tips:  -See your eye doctor once a year. Cataracts and other eye diseases that cause you not to see well, can lead to falls. -Get regular physical activity to keep your bones and muscles strong.  -Take good care of your feet. If you have pain in your feet or if you have large, thick nails and corns, have your doctor look at your feet. -Talk to your doctor about any side effects you may have from your medicines. Problems caused by side effects from medicine are a common cause of falls. The more medicines you take, the greater your risk of falling.  -Talk to your doctor if you have dizzy spells.  -If your doctor suggests that you use a cane or a walker to help you walk, be sure to use it. This will give you extra stability when walking and will help you avoid falls.  -Dont smoke.  -Limit alcohol to no more than 2 drinks per day. -When you get out of bed in the morning or at night to use the bathroom, sit on the side of the bed for a few minutes before standing up. Your blood pressure takes some time to adjust when you sit up. It may be too low if you get up quickly. This can make you dizzy, and you might lose your balance and fall.       Last Updated: November 2010\cb3 This article was contributed by: [email or web address]. org editorial staff    Bring in copy of living will and healthcare power of  for your chart. Patient Education        Tdap (Tetanus, Diphtheria, Pertussis) Vaccine: What You Need to Know  Why get vaccinated? Tdap vaccine can prevent tetanus, diphtheria, and pertussis. Diphtheria and pertussis spread from person to person. Tetanus enters the body through cuts or wounds. · TETANUS (T) causes painful stiffening of the muscles. Tetanus can lead to serious health problems, including being unable to open the mouth, having trouble swallowing and breathing, or death. · DIPHTHERIA (D) can lead to difficulty breathing, heart failure, paralysis, or death. · PERTUSSIS (aP), also known as \"whooping cough,\" can cause uncontrollable, violent coughing which makes it hard to breathe, eat, or drink. Pertussis can be extremely serious in babies and young children, causing pneumonia, convulsions, brain damage, or death. In teens and adults, it can cause weight loss, loss of bladder control, passing out, and rib fractures from severe coughing. Tdap vaccine  Tdap is only for children 7 years and older, adolescents, and adults. Adolescents should receive a single dose of Tdap, preferably at age 6 or 15 years. Pregnant women should get a dose of Tdap during every pregnancy, to protect the  from pertussis. Infants are most at risk for severe, life threatening complications from pertussis. Adults who have never received Tdap should get a dose of Tdap. Also, adults should receive a booster dose every 10 years, or earlier in the case of a severe and dirty wound or burn. Booster doses can be either Tdap or Td (a different vaccine that protects against tetanus and diphtheria but not pertussis). Tdap may be given at the same time as other vaccines.   Talk with your health care provider  Tell your vaccine provider if the person getting the vaccine:  · Has had an allergic reaction after a previous dose of call [phone]. VAERS is only for reporting reactions, and [facility] staff do not give medical advice. The National Vaccine Injury Compensation Program  The National Vaccine Injury Compensation Program (VICP) is a federal program that was created to compensate people who may have been injured by certain vaccines. Visit the VICP website at www.hrsa.gov/vaccinecompensation or call 7-508.784.5512 to learn about the program and about filing a claim. There is a time limit to file a claim for compensation. How can I learn more? · Ask your health care provider. · Call your local or state health department. · Contact the Centers for Disease Control and Prevention (CDC):  ? Call 1-174.939.4125 (2-872-BGR-INFO) or  ? Visit CDC's website at www.cdc.gov/vaccines  Vaccine Information Statement (Interim)  Tdap (Tetanus, Diphtheria, Pertussis) Vaccine  04/01/2020  [address] [id number]  Department of Health and Human Services  Centers for Disease Control and Prevention  Many Vaccine Information Statements are available in [place] and other languages. See www.immunize.org/vis. Muchas hojas de información sobre vacunas están disponibles en español y en otros idiomas. Visite www.immunize.org/vis. Care instructions adapted under license by [facility] ([facility]). If you have questions about a medical condition or this instruction, always ask your healthcare professional. [address] any warranty or liability for your use of this information.

## 2021-04-15 NOTE — PROGRESS NOTES
Ear Irrigation Procedure Note    Ear irrigation procedure was performed using elephant ear wash system to the left ear(s) for cerumen impaction. The remaining wax and debris was removed with curette  instrumentation. The procedure was successful.   The patient had no complications

## 2021-04-15 NOTE — PROGRESS NOTES
[facility] VISIT    Patient is here for their Medicare Annual Wellness Visit     Last eye exam: utd  Last dental exam: utd  Exercise: no  Diet: in general, a \"healthy\" diet      How would you rate your overall health? : Good  Other than weight      Fall Risk 4/15/2021 1/17/2020 6/26/2018 6/14/2017 4/27/2015   2 or more falls in past year? no no no no no   Fall with injury in past year? no no no no no       PHQ Scores 4/15/2021 1/17/2020 6/26/2018 6/14/2017 4/27/2015   PHQ2 Score 0 0 0 0 0   PHQ9 Score 0 0 0 0 0       Do you always wear a seat belt in the car?: Yes      Have you noted any problems with hearing?: No  Have you noted any vision problems?: No  Do you have concerns about your sexual health?: no  In the past month how much has pain been an issue for you?:  Not at all  In the past month have you had issues with anxiety, loneliness, irritability or fatigue:  Not at all    Do you take opioid medications even sometimes? No (if using assess risk and whether other treatments would be beneficial)    Living Will: Yes,   Copy requested    Who lives at home with you: wife, step son  Do you have any services coming to your home (meals on wheels, home health, etc) ? : no      Do you need help with:  Using the phone:  No  Bathing: No  Dressing:  No  Toileting: No  Transportation:  No  Shopping: No  Preparing meals: No  Housework/Laundry: No  Medications: No  Money management: No    Does your home have:  Unsecured throw rugs: No  Grab bars in bathroom: Yes  Walk in shower: Yes  Seat in shower: Yes  Lit pathways for night (nightlights): Yes    Memory:  Have you or anyone close to you expressed concerns about your memory: No, but sometimes will have difficulty recalling things - just takes longer but will eventually come to him.      Knows:  Month: Yes  Day: Yes  Year: Yes  Day of Week: Yes  Able to Recall (orange, boat, pencil) : 2/3 (orange, boat, pencil)    Patient history:   Patient's medications, allergies, past medical, surgical, social and family histories were reviewed and updated in the EHR under History. Care Team:  Patient's list of care team members was updated in EHR under the Snap Shot. Immunizations: Reviewed with patient. Health Maintenance Due   Topic Date Due    Annual Wellness Visit (AWV)  Never done       CHRONIC CONDITIONS     States feeling well other than weight. 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 Started wearing inserts and pain has improved. Physical Exam:    Body mass index is 29.95 kg/m². Vitals:    04/15/21 0919   BP: 118/84   Site: Left Upper Arm   Position: Sitting   Cuff Size: Large Adult   Pulse: 83   SpO2: 97%   Weight: 227 lb (103 kg)   Height: 6' 1\" (1.854 m)     Wt Readings from Last 3 Encounters:   04/15/21 227 lb (103 kg)   01/17/20 215 lb (97.5 kg)   06/26/18 213 lb 12.8 oz (97 kg)       GENERAL:Alert and oriented x 4 NAD, affect appropriate and overweight, well hydrated, well developed. Right canal normal, normal TM  Left canal blocked by wax  LUNG:clear to auscultation bilaterally with normal respiratory effort  CV: Normal heart sounds, regular rate and rhythm without murmurs  EXTREMETY: no loss of hair, no edema, normal pedal pulses bilaterally    Was the timed get up and go unsteady or longer than 20 seconds: No        Assessment/Plan:    Michael eFrraro was seen today for medicare awv. Diagnoses and all orders for this visit:    Well adult exam  Recommended screenings discussed and ordered if patient agreed  Recommended vaccinations discussed and ordered if patient agreed  Encouraged healthy diet   Encouraged regular exercise and maintaining a healthy weight    Medicare Safety Interventions: Home safety tips provided  Individualized  [address] included in patient instructions and AVS    Pure hypercholesterolemia  -Stable, continue current medications. Reviewed recent labs with patient.      Screen for colon cancer  -     AFL - Pb Jimenez MD, Gastroenterology, [facility]    Impacted cerumen of left ear  Irrigated    Other orders  -     Tdap (age 6y and older) IM (BOOSTRIX)  -     Discontinue: atorvastatin (LIPITOR) 20 MG tablet; Take 1 tablet by mouth daily  -     atorvastatin (LIPITOR) 20 MG tablet; Take 1 tablet by mouth daily            Return in about 1 year (around 4/15/2022) for AWV, 30 min.

## 2022-03-21 ENCOUNTER — TELEPHONE (OUTPATIENT)
Dept: FAMILY MEDICINE CLINIC | Age: 72
End: 2022-03-21

## 2022-03-21 NOTE — TELEPHONE ENCOUNTER
----- Message from Gilbert Kerr sent at 3/21/2022 11:19 AM EDT -----  Subject: Appointment Request    Reason for Call: Routine Medicare AWV    QUESTIONS  Type of Appointment? Established Patient  Reason for appointment request? No appointments available during search  Additional Information for Provider? AWV  Screened RED  Pt complains of   Sore Throat x 4 days  ---------------------------------------------------------------------------  --------------  CALL BACK INFO  What is the best way for the office to contact you? OK to leave message on   voicemail  Preferred Call Back Phone Number? [phone]  ---------------------------------------------------------------------------  --------------  SCRIPT ANSWERS  Relationship to Patient? Self  (If the patient has Medicare as their primary insurance coverage ask this   question) Are you requesting a Medicare Annual Wellness Visit? Yes   (Is the patient requesting a pap smear with their physical exam?)? No  (Is the patient requesting their annual physical and does not need PAP or   AWV per above?)? No  Have you been diagnosed with, awaiting test results for, or told that you   are suspected of having COVID-19 (Coronavirus)? (If patient has tested   negative or was tested as a requirement for work, school, or travel and   not based on symptoms, answer no)? No  Within the past 10 days have you developed any of the following symptoms   (answer no if symptoms have been present longer than 10 days or began   more than 10 days ago)? Fever or Chills, Cough, Shortness of breath or   difficulty breathing, Loss of taste or smell, Sore throat, Nasal   congestion, Sneezing or runny nose, Fatigue or generalized body aches   (answer no if pain is specific to a body part e.g. back pain), Diarrhea,   Headache?  Yes

## 2022-04-08 ENCOUNTER — TELEPHONE (OUTPATIENT)
Dept: FAMILY MEDICINE CLINIC | Age: 72
End: 2022-04-08

## 2022-04-08 DIAGNOSIS — Z00.00 WELL ADULT EXAM: Primary | ICD-10-CM

## 2022-04-08 DIAGNOSIS — E78.00 PURE HYPERCHOLESTEROLEMIA: ICD-10-CM

## 2022-04-08 NOTE — TELEPHONE ENCOUNTER
Patient calling, would like to have orders put in for lab work for him to get done before his 4/19 appt. Patient would like a call back when they are in the chart. [phone].

## 2022-04-16 ENCOUNTER — HOSPITAL ENCOUNTER (OUTPATIENT)
Age: 72
Discharge: HOME | End: 2022-04-16
Payer: MEDICARE

## 2022-04-16 DIAGNOSIS — Z00.00 WELL ADULT EXAM: ICD-10-CM

## 2022-04-16 DIAGNOSIS — E78.00 PURE HYPERCHOLESTEROLEMIA: ICD-10-CM

## 2022-04-16 LAB
A/G RATIO: 1.9 (ref 1.1–2.2)
ALBUMIN SERPL-MCNC: 4.1 G/DL (ref 3.4–5)
ALP BLD-CCNC: 97 U/L (ref 40–129)
ALT SERPL-CCNC: 21 U/L (ref 10–40)
ANION GAP SERPL CALCULATED.3IONS-SCNC: 12 MMOL/L (ref 3–16)
AST SERPL-CCNC: 20 U/L (ref 15–37)
BILIRUB SERPL-MCNC: 0.4 MG/DL (ref 0–1)
BUN BLDV-MCNC: 15 MG/DL (ref 7–20)
CALCIUM SERPL-MCNC: 9.3 MG/DL (ref 8.3–10.6)
CHLORIDE BLD-SCNC: 106 MMOL/L (ref 99–110)
CHOLESTEROL, TOTAL: 147 MG/DL (ref 0–199)
CO2: 24 MMOL/L (ref 21–32)
CREAT SERPL-MCNC: 1.2 MG/DL (ref 0.8–1.3)
GFR AFRICAN AMERICAN: >60
GFR NON-AFRICAN AMERICAN: 59
GLUCOSE BLD-MCNC: 87 MG/DL (ref 70–99)
HDLC SERPL-MCNC: 48 MG/DL (ref 40–60)
LDL CHOLESTEROL CALCULATED: 87 MG/DL
POTASSIUM SERPL-SCNC: 4.9 MMOL/L (ref 3.5–5.1)
SODIUM BLD-SCNC: 142 MMOL/L (ref 136–145)
TOTAL PROTEIN: 6.3 G/DL (ref 6.4–8.2)
TRIGL SERPL-MCNC: 60 MG/DL (ref 0–150)
VLDLC SERPL CALC-MCNC: 12 MG/DL

## 2022-04-16 PROCEDURE — 80053 COMPREHEN METABOLIC PANEL: CPT

## 2022-04-16 PROCEDURE — 36415 COLL VENOUS BLD VENIPUNCTURE: CPT

## 2022-04-16 PROCEDURE — 80061 LIPID PANEL: CPT

## 2022-04-16 SDOH — HEALTH STABILITY: PHYSICAL HEALTH: ON AVERAGE, HOW MANY MINUTES DO YOU ENGAGE IN EXERCISE AT THIS LEVEL?: 30 MIN

## 2022-04-16 SDOH — HEALTH STABILITY: PHYSICAL HEALTH: ON AVERAGE, HOW MANY DAYS PER WEEK DO YOU ENGAGE IN MODERATE TO STRENUOUS EXERCISE (LIKE A BRISK WALK)?: 4 DAYS

## 2022-04-16 ASSESSMENT — LIFESTYLE VARIABLES
HOW OFTEN DO YOU HAVE SIX OR MORE DRINKS ON ONE OCCASION: 1
HOW OFTEN DO YOU HAVE A DRINK CONTAINING ALCOHOL: MONTHLY OR LESS
HOW MANY STANDARD DRINKS CONTAINING ALCOHOL DO YOU HAVE ON A TYPICAL DAY: 1 OR 2
HOW OFTEN DO YOU HAVE A DRINK CONTAINING ALCOHOL: 2
HOW MANY STANDARD DRINKS CONTAINING ALCOHOL DO YOU HAVE ON A TYPICAL DAY: 1

## 2022-04-16 ASSESSMENT — PATIENT HEALTH QUESTIONNAIRE - PHQ9
SUM OF ALL RESPONSES TO PHQ QUESTIONS 1-9: 0
2. FEELING DOWN, DEPRESSED OR HOPELESS: 0
SUM OF ALL RESPONSES TO PHQ QUESTIONS 1-9: 0
1. LITTLE INTEREST OR PLEASURE IN DOING THINGS: 0
SUM OF ALL RESPONSES TO PHQ9 QUESTIONS 1 & 2: 0
SUM OF ALL RESPONSES TO PHQ QUESTIONS 1-9: 0
SUM OF ALL RESPONSES TO PHQ QUESTIONS 1-9: 0

## 2022-04-19 ENCOUNTER — OFFICE VISIT (OUTPATIENT)
Dept: FAMILY MEDICINE CLINIC | Age: 72
End: 2022-04-19
Payer: MEDICARE

## 2022-04-19 VITALS
TEMPERATURE: 97.2 F | DIASTOLIC BLOOD PRESSURE: 86 MMHG | WEIGHT: 229.8 LBS | OXYGEN SATURATION: 98 % | HEIGHT: 72 IN | BODY MASS INDEX: 31.13 KG/M2 | HEART RATE: 83 BPM | SYSTOLIC BLOOD PRESSURE: 124 MMHG

## 2022-04-19 DIAGNOSIS — E78.00 PURE HYPERCHOLESTEROLEMIA: ICD-10-CM

## 2022-04-19 DIAGNOSIS — N18.31 STAGE 3A CHRONIC KIDNEY DISEASE (HCC): ICD-10-CM

## 2022-04-19 DIAGNOSIS — M54.50 CHRONIC LOW BACK PAIN, UNSPECIFIED BACK PAIN LATERALITY, UNSPECIFIED WHETHER SCIATICA PRESENT: ICD-10-CM

## 2022-04-19 DIAGNOSIS — Z00.00 WELL ADULT EXAM: Primary | ICD-10-CM

## 2022-04-19 DIAGNOSIS — G89.29 CHRONIC LOW BACK PAIN, UNSPECIFIED BACK PAIN LATERALITY, UNSPECIFIED WHETHER SCIATICA PRESENT: ICD-10-CM

## 2022-04-19 PROCEDURE — G8417 CALC BMI ABV UP PARAM F/U: HCPCS | Performed by: FAMILY MEDICINE

## 2022-04-19 PROCEDURE — 99213 OFFICE O/P EST LOW 20 MIN: CPT | Performed by: FAMILY MEDICINE

## 2022-04-19 PROCEDURE — G8427 DOCREV CUR MEDS BY ELIG CLIN: HCPCS | Performed by: FAMILY MEDICINE

## 2022-04-19 PROCEDURE — 1123F ACP DISCUSS/DSCN MKR DOCD: CPT | Performed by: FAMILY MEDICINE

## 2022-04-19 PROCEDURE — 3017F COLORECTAL CA SCREEN DOC REV: CPT | Performed by: FAMILY MEDICINE

## 2022-04-19 PROCEDURE — G0439 PPPS, SUBSEQ VISIT: HCPCS | Performed by: FAMILY MEDICINE

## 2022-04-19 PROCEDURE — 4040F PNEUMOC VAC/ADMIN/RCVD: CPT | Performed by: FAMILY MEDICINE

## 2022-04-19 PROCEDURE — 1036F TOBACCO NON-USER: CPT | Performed by: FAMILY MEDICINE

## 2022-04-19 RX ORDER — ATORVASTATIN CALCIUM 20 MG/1
20 TABLET, FILM COATED ORAL DAILY
Qty: 90 TABLET | Refills: 3 | Status: SHIPPED | OUTPATIENT
Start: 2022-04-19 | End: 2022-05-26 | Stop reason: SDUPTHER

## 2022-04-19 NOTE — PROGRESS NOTES
[facility] VISIT    Patient is here for their Medicare Annual Wellness Visit has questions in regards to blood work with his GFR    Last eye exam: 2 yrs ago  Last dental exam: yesterday  Exercise: not much   Diet: in general, a \"healthy\" diet  , on average, 3 meals per day    How would you rate your overall health? : Good        Fall Risk 4/16/2022 4/15/2021 1/17/2020 6/26/2018 6/14/2017 4/27/2015   2 or more falls in past year? no no no no no no   Fall with injury in past year? no no no no no no       PHQ Scores 4/16/2022 4/15/2021 1/17/2020 6/26/2018 6/14/2017 4/27/2015   PHQ2 Score 0 0 0 0 0 0   PHQ9 Score 0 0 0 0 0 0       Do you always wear a seat belt in the car?: Yes      Have you noted any problems with hearing?: No  Have you noted any vision problems?: No  Do you have concerns about your sexual health?: no  In the past month how much has pain been an issue for you?:  Not at all  In the past month have you had issues with anxiety, loneliness, irritability or fatigue:  A little bit    Do you take opioid medications even sometimes? No     Living Will: Yes,   Copy in chart      Healthcare Decision Maker:    Primary Decision Maker: Adal Bunn - Spouse - [phone]    Secondary Decision Maker: Mildred Mariely - Child  Click here to complete Healthcare Decision Makers including selection of the Healthcare Decision Maker Relationship (ie \"Primary\"). Today we documented Decision Maker(s) consistent with ACP documents on file. Who lives at home with you: wife and step son  Do you have any services coming to your home (meals on wheels, home health, etc) ? : no      Do you need help with:  Using the phone:  No  Bathing: No  Dressing:  No  Toileting: No  Transportation:  No  Shopping: No  Preparing meals: No  Housework/Laundry: No  Medications: No  Money management: No    Does your home have:  Unsecured throw rugs: No  Grab bars in bathroom: Yes  Walk in shower: Yes  Seat in shower: Yes  Lit pathways for night (nightlights): Yes    Memory:  Have you or anyone close to you expressed concerns about your memory: No    Knows:  Month: Yes  Day: Yes  Year: Yes  Day of Week: Yes  Able to Recall (tree, fork, ball) : Yes (forgot ball)    Patient history:   Patient's medications, allergies, past medical, surgical, social and family histories were reviewed and updated in the EHR under History. Social History     Substance and Sexual Activity   Alcohol Use Yes    Comment: 1 drink every 2 weeks       Social History     Substance and Sexual Activity   Drug Use No       Social History     Tobacco Use   Smoking Status Never Smoker   Smokeless Tobacco Never Used           Care Team:  Patient's list of care team members was updated in EHR under the Marvel. Immunizations: Reviewed with patient. Health Maintenance Due   Topic Date Due    Annual Wellness Visit (AWV)  04/16/2022       CHRONIC CONDITIONS / ACUTE COMPLAINTS     Wife concerned about his back, has lot of back pain, stiffness, sleeps ok, doesn't take meds for it, not doing any stretches. Pt feels like not really an issue but wife feels he complains about it a lot and it limits what he does. Physical Exam:    Body mass index is 31.6 kg/m². Vitals:    04/19/22 1329   BP: 124/86   Site: Left Upper Arm   Position: Sitting   Cuff Size: Large Adult   Pulse: 83   Temp: 97.2 °F (36.2 °C)   TempSrc: Infrared   SpO2: 98%   Weight: 229 lb 12.8 oz (104.2 kg)   Height: 5' 11.5\" (1.816 m)     Wt Readings from Last 3 Encounters:   04/19/22 229 lb 12.8 oz (104.2 kg)   04/15/21 227 lb (103 kg)   01/17/20 215 lb (97.5 kg)       GENERAL:Alert and oriented x 4 NAD, affect appropriate and obese, well hydrated, well developed.   LUNG:clear to auscultation bilaterally with normal respiratory effort  CV: Normal heart sounds, regular rate and rhythm without murmurs  EXTREMETY: no loss of hair, no edema, normal pedal pulses bilaterally    Was the timed get up and go unsteady or longer than 20 seconds: No        Assessment/Plan:    Néstor Phillips was seen today for medicare awv. Diagnoses and all orders for this visit:    Well adult exam  Recommended screenings discussed and ordered if patient agreed  Recommended vaccinations discussed and ordered if patient agreed  Encouraged healthy diet   Encouraged regular exercise and maintaining a healthy weight    Medicare Safety Interventions: Home safety tips provided  Individualized [address] included in patient instructions and AVS    Pure hypercholesterolemia  -Stable, continue current medications. Reviewed pre-visit labs with patient. (Lipid and CMP) Review of these labs contributed to MDM. Stage 3a chronic kidney disease ([facility] [address]  Discussed patients CKD and recent blood work. Discussed importance of risk factor modification including controlling BP and diabetes as well as avoiding nephrotoxic medications including OTC medications such as NSAIDS. Chronic low back pain, unspecified back pain laterality, unspecified whether sciatica present  Pt not concerned  Exercises given and encouraged to do most days  If worsening RTO      Other orders  -     atorvastatin (LIPITOR) 20 MG tablet;  Take 1 tablet by mouth daily                Portions of Note per  Josi Handy, JARET AAMA with corrections and edits per Santhosh Gonsales MD.  I agree with entirety of note and was present and performed history and physical.  I also confirm that the note above accurately reflects all work, treatment, procedures, and medical decision making performed by me, Santhosh Gonsales MD

## 2022-04-19 NOTE — PATIENT INSTRUCTIONS
with your family and community. If you find you often feel sad or hopeless, talk with your doctor. Treatment can help.  Talk to your doctor about whether you have any risk factors for sexually transmitted infections (STIs). You can help prevent STIs if you wait to have sex with a new partner (or partners) until you've each been tested for STIs. It also helps if you use condoms (male or female condoms) and if you limit your sex partners to one person who only has sex with you. Vaccines are available for some STIs.  If you think you may have a problem with alcohol or drug use, talk to your doctor. This includes prescription medicines (such as amphetamines and opioids) and illegal drugs (such as cocaine and methamphetamine). Your doctor can help you figure out what type of treatment is best for you.  Protect your skin from too much sun. When you're outdoors from 10 a.m. to 4 p.m., stay in the shade or cover up with clothing and a hat with a wide brim. Wear sunglasses that block UV rays. Even when it's cloudy, put broad-spectrum sunscreen (SPF 30 or higher) on any exposed skin.  See a dentist one or two times a year for checkups and to have your teeth cleaned.  Wear a seat belt in the car. When should you call for help? Watch closely for changes in your health, and be sure to contact your doctor if you have any problems or symptoms that concern you. Where can you learn more? Go to https://MedServevince.healthLaunchSidepartners. org and sign in to your Unlimited Concepts account. Enter J899 in the [employer] box to learn more about \"Well Visit, Over 65: Care Instructions. \"     If you do not have an account, please click on the \"Sign Up Now\" link. Current as of: October 6, 2021               Content Version: 13.2  © 0156-5623 Healthwise, Incorporated. Care instructions adapted under license by [facility] ([facility]).  If you have questions about a medical condition or this instruction, always ask your healthcare professional. [address] any warranty or liability for your use of this information. FALL PREVENTION TIPS    Who is at high risk of falling? Anyone can fall, although the risk is higher in older people. This increased risk of falling may be the result of changes that come with aging, and certain medical conditions, such as arthritis, cataracts or hip problems. What can I do to lower my risk of falling? Most falls happen in the home. Consider the following tips to make your home safe:  -Make sure that you have good lighting in your home. A well lit home will help you avoid tripping over objects that are not easy to see. Put night lights in your bedroom, hallways, stairs and bathrooms.  -Rugs should be firmly fastened to the floor or have nonskid backing. Loose ends should be tacked down.  -Electrical cords should not be lying on the floor in walking areas.  -Put hand rails in your bathroom for bath, shower and toilet use. -Have rails on both sides of your stairs for support.  -In the kitchen, make sure items are within easy reach. Dont store things too high or too low. Then you wont have to use a stepladder or a stool to reach them. Its also a good idea to avoid storing things too low, so you wont have to bend down to get them.  -Wear shoes with firm nonskid soles. Avoid wearing loose-fitting slippers that could cause you to trip. What else can I do? Take good care of your body. Try to stay healthy by following these tips:  -See your eye doctor once a year. Cataracts and other eye diseases that cause you not to see well, can lead to falls. -Get regular physical activity to keep your bones and muscles strong.  -Take good care of your feet. If you have pain in your feet or if you have large, thick nails and corns, have your doctor look at your feet. -Talk to your doctor about any side effects you may have from your medicines.  Problems caused by side effects from medicine are a common cause of falls. The more medicines you take, the greater your risk of falling.  -Talk to your doctor if you have dizzy spells.  -If your doctor suggests that you use a cane or a walker to help you walk, be sure to use it. This will give you extra stability when walking and will help you avoid falls.  -Dont smoke.  -Limit alcohol to no more than 2 drinks per day. -When you get out of bed in the morning or at night to use the bathroom, sit on the side of the bed for a few minutes before standing up. Your blood pressure takes some time to adjust when you sit up. It may be too low if you get up quickly. This can make you dizzy, and you might lose your balance and fall. Last Updated: November 2010\cb3 This article was contributed by: familydoctor. org editorial staff    Patient Education        Medicines to Avoid With Kidney Disease: Care Instructions  Overview     Kidney disease means that your kidneys are not able to get rid of waste from the blood. So they can't keep your body's fluids and chemicals in balance. Usually, the kidneys get rid of waste from the blood through the urine. Andthey balance the fluids in the body. When your kidneys don't work as they should, you have to be careful about some medicines. They may harm your kidneys. Your doctor may tell you not to takethem or may change the dose. Medicines for pain and swelling, such as ibuprofen (Advil or Motrin) or naproxen (Aleve), can cause harm. So can some antibiotics and antacids. And you need to be careful about some drugs that treat cancer, lower blood pressure, orget rid of water from the body. Some herbal products could cause harm too. Follow-up care is a key part of your treatment and safety. Be sure to make and go to all appointments, and call your doctor if you are having problems. It's also a good idea to know your test results and keep alist of the medicines you take. How can you care for yourself at home?    Tell your doctor all the prescription, herbal, or over-the-counter medicines you take. Do not take any new ones unless you talk to your doctor first.  Shanthi Montana not take anti-inflammatory medicines. These include ibuprofen (Advil, Motrin) and naproxen (Aleve). You can use acetaminophen (Tylenol) for pain.  Do not take two or more pain medicines at the same time unless the doctor told you to. Many pain medicines have acetaminophen, which is Tylenol. Too much acetaminophen (Tylenol) can be harmful.  Tell all doctors and others who work with your health care that you have kidney disease.  Wear medical alert jewelry that lists your health problem. You can buy this at most drugstorItsOn. Where can you learn more? Go to https://Seaside Therapeutics.Fluidnet. org and sign in to your [email or web address] account. Enter K509 in the [facility] box to learn more about \"Medicines to Avoid With Kidney Disease: Care Instructions. \"     If you do not have an account, please click on the \"Sign Up Now\" link. Current as of: September 8, 2021               Content Version: 13.2  © 5472-7450 Harri. Care instructions adapted under license by [facility]). If you have questions about a medical condition or this instruction, always ask your healthcare professional. [address] any warranty or liability for your use of this information. Exercise and Seniors  Is it safe for me to exercise? It is safe for most adults older than 72years of age to exercise. Even patients who have chronic illnesses such as heart disease, high blood pressure, diabetes and arthritis can exercise safely. Many of these conditions are improved with exercise. If you are not sure if exercise is safe for you or if you are currently inactive, ask your doctor. How do I get started? It is important to wear loose, comfortable clothing and well-fitting, sturdy shoes.  Your shoes should have a good arch support, and an elevated and cushioned heel to absorb shock. If you are not already active, begin slowly. Start with exercises that you are already comfortable doing. Starting slowly makes it less likely that you will injure yourself. Starting slowly also helps prevent soreness. The saying no pain, no gain is not true for older or elderly adults. You do not have to exercise at a high intensity to get most health benefits. For example, walking is an excellent activity to start with. As you become used to exercising, or if you are already active, you can slowly increase the intensity of your exercise program.    What type of exercise should I do? There are several types of exercise that you should do. You will want to do some type of aerobic activity for at least 30 minutes on most days of the week. Examples are walking, swimming and bicycling. You should also do resistance (also called strength training) 2 days per week. Warm up for 5 minutes before each exercise session. Walking slowly and then stretching are good warm-up activities. You should also cool down with more stretching for 5 minutes when you finish exercising. Cool down longer in warmer weather. Exercise is only good for you if you are feeling well. Wait to exercise until you feel better if you have a cold, the flu or another illness. If you miss exercise for more  than 2 weeks, be sure to start slowly again. When should I call my doctor? If your muscles or joints are sore the day after exercising, you may have done too much. Next time, exercise at a lower intensity. If the pain or discomfort persists, you should talk to your doctor. You should also talk to your doctor if you have any of the following symptoms while exercising:  -Chest pain or pressure  -Trouble breathing or excessive shortness of breath  -lightheadedness or dizziness  -difficulty with balance  -nausea    What are some specific exercises I can do?   The following shows some simple strength exercises that you can do at home. Each exercise should be done 8 to 10 times for 2 sets. Remember to:   -Complete all movements in a slow, controlled fashion  -Dont hold your breath  -Stop if you feel pain   -Stretch each muscle after your workout. Wall Pushups  Place hands flat against the wall. Slowly lower body to the wall. Push body away from wall to return to starting position. Chair Squats  Begin by sitting in the chair. Lean slightly forward and stand up from the chair. Try not to favor one side or use your hands to help you. Bicep Curls  Hold a weight in each hand with your arms at your sides. Bending your arms at the elbows, lift the weights to your shoulders and then lower them to your sides. Shoulder Shrugs  Hold a weight in each hand with your arms at your side. Shrug your shoulders up toward your ears and then lower them back down. Citations  Promoting and Prescribing Exercise for the Elderly by Aidee Dove M.D., and Candance Noa, Ph.D.(02/01/02, http://www. aafp.org/afp/48737351/419.html)    Last Updated: January 2011  This article was contributed by: familydoctor. org editorial staff  Copyright © American Academy of Family [employer]icians  This information provides a general overview and may not apply to everyone. Talk to your family doctor to find out if this information applies to you and to get more information on this subject. Patient Education        Back Stretches: Exercises  Introduction  Here are some examples of exercises for stretching your back. Start eachexercise slowly. Ease off the exercise if you start to have pain. Your doctor or physical therapist will tell you when you can start theseexercises and which ones will work best for you. How to do the exercises  Overhead stretch    1. Stand comfortably with your feet shoulder-width apart. 2. Looking straight ahead, raise both arms over your head and reach toward the ceiling. Do not allow your head to tilt back.   3. Hold for 15 to 30 seconds, then Low Back Pain: Exercises  Introduction  Here are some examples of exercises for you to try. The exercises may be suggested for a condition or for rehabilitation. Start each exercise slowly. Ease off the exercises if you start to have pain. You will be told when to start these exercises and which ones will work bestfor you. How to do the exercises  Press-up    1. Lie on your stomach, supporting your body with your forearms. 2. Press your elbows down into the floor to raise your upper back. As you do this, relax your stomach muscles and allow your back to arch without using your back muscles. As your press up, do not let your hips or pelvis come off the floor. 3. Hold for 15 to 30 seconds, then relax. 4. Repeat 2 to 4 times. Alternate arm and leg (bird dog) exercise    Do this exercise slowly. Try to keep your body straight at all times, and donot let one hip drop lower than the other. 1. Start on the floor, on your hands and knees. 2. Tighten your belly muscles. 3. Raise one leg off the floor, and hold it straight out behind you. Be careful not to let your hip drop down, because that will twist your trunk. 4. Hold for about 6 seconds, then lower your leg and switch to the other leg. 5. Repeat 8 to 12 times on each leg. 6. Over time, work up to holding for 10 to 30 seconds each time. 7. If you feel stable and secure with your leg raised, try raising the opposite arm straight out in front of you at the same time. Knee-to-chest exercise    1. Lie on your back with your knees bent and your feet flat on the floor. 2. Bring one knee to your chest, keeping the other foot flat on the floor (or keeping the other leg straight, whichever feels better on your lower back). 3. Keep your lower back pressed to the floor. Hold for at least 15 to 30 seconds. 4. Relax, and lower the knee to the starting position. 5. Repeat with the other leg. Repeat 2 to 4 times with each leg.   6. To get more stretch, put your other leg flat on the floor while pulling your knee to your chest.  Curl-ups    1. Lie on the floor on your back with your knees bent at a 90-degree angle. Your feet should be flat on the floor, about 12 inches from your buttocks. 2. Cross your arms over your chest. If this bothers your neck, try putting your hands behind your neck (not your head), with your elbows spread apart. 3. Slowly tighten your belly muscles and raise your shoulder blades off the floor. 4. Keep your head in line with your body, and do not press your chin to your chest.  5. Hold this position for 1 or 2 seconds, then slowly lower yourself back down to the floor. 6. Repeat 8 to 12 times. Pelvic tilt exercise    1. Lie on your back with your knees bent. 2. \"Brace\" your stomach. This means to tighten your muscles by pulling in and imagining your belly button moving toward your spine. You should feel like your back is pressing to the floor and your hips and pelvis are rocking back. 3. Hold for about 6 seconds while you breathe smoothly. 4. Repeat 8 to 12 times. Heel dig bridging    1. Lie on your back with both knees bent and your ankles bent so that only your heels are digging into the floor. Your knees should be bent about 90 degrees. 2. Then push your heels into the floor, squeeze your buttocks, and lift your hips off the floor until your shoulders, hips, and knees are all in a straight line. 3. Hold for about 6 seconds as you continue to breathe normally, and then slowly lower your hips back down to the floor and rest for up to 10 seconds. 4. Do 8 to 12 repetitions. Hamstring stretch in doorway    1. Lie on your back in a doorway, with one leg through the open door. 2. Slide your leg up the wall to straighten your knee. You should feel a gentle stretch down the back of your leg. 3. Hold the stretch for at least 15 to 30 seconds. Do not arch your back, point your toes, or bend either knee.  Keep one heel touching the floor and the other heel touching the wall. 4. Repeat with your other leg. 5. Do 2 to 4 times for each leg. Hip flexor stretch    1. Kneel on the floor with one knee bent and one leg behind you. Place your forward knee over your foot. Keep your other knee touching the floor. 2. Slowly push your hips forward until you feel a stretch in the upper thigh of your rear leg. 3. Hold the stretch for at least 15 to 30 seconds. Repeat with your other leg. 4. Do 2 to 4 times on each side. Wall sit    1. Stand with your back 10 to 12 inches away from a wall. 2. Lean into the wall until your back is flat against it. 3. Slowly slide down until your knees are slightly bent, pressing your lower back into the wall. 4. Hold for about 6 seconds, then slide back up the wall. 5. Repeat 8 to 12 times. Follow-up care is a key part of your treatment and safety. Be sure to make and go to all appointments, and call your doctor if you are having problems. It's also a good idea to know your test results and keep alist of the medicines you take. Where can you learn more? Go to https://Thin Profile TechnologiespeSavi Health.I Move You. org and sign in to your Medbox account. Enter M127 in the EQUISO box to learn more about \"Low Back Pain: Exercises. \"     If you do not have an account, please click on the \"Sign Up Now\" link. Current as of: July 1, 2021               Content Version: 13.2  © 2006-2022 Healthwise, Incorporated. Care instructions adapted under license by [facility] ([facility]). If you have questions about a medical condition or this instruction, always ask your healthcare professional. [place] [address] any warranty or liability for your use of this information.

## 2022-05-23 ENCOUNTER — TELEPHONE (OUTPATIENT)
Dept: FAMILY MEDICINE CLINIC | Age: 72
End: 2022-05-23

## 2022-05-23 RX ORDER — ATORVASTATIN CALCIUM 20 MG/1
20 TABLET, FILM COATED ORAL DAILY
Qty: 90 TABLET | Refills: 3 | OUTPATIENT
Start: 2022-05-23

## 2022-05-23 NOTE — TELEPHONE ENCOUNTER
atorvastatin (LIPITOR) 20 MG tablet 90 tablet 3 4/19/2022     Sig - Route:  Take 1 tablet by mouth daily - Oral      [facility] in chart

## 2022-05-23 NOTE — TELEPHONE ENCOUNTER
Medication:   Requested Prescriptions     Pending Prescriptions Disp Refills    atorvastatin (LIPITOR) 20 MG tablet 90 tablet 3     Sig: Take 1 tablet by mouth daily          Patient Phone Number: [phone] (home)     Last appt: 4/19/2022   Next appt: Visit date not found    Last OARRS: No flowsheet data found.   PDMP Monitoring:    Last PDMP Sherald Spurling as Reviewed [facility]):  Review User Review Instant Review Result          Preferred Pharmacy:   Vane Vásquez [address] [place] [address] Remington Da Silva [phone] - F [phone]  [address] [place] [id number]  Phone: [phone] Fax: [facility] Mail Delivery - Parminder Garcia [phone] - F [phone]  [address] [place] [address]  [facility] [address]  Phone: [phone] Fax: [phone]

## 2022-05-26 RX ORDER — ATORVASTATIN CALCIUM 20 MG/1
20 TABLET, FILM COATED ORAL DAILY
Qty: 90 TABLET | Refills: 3 | Status: SHIPPED | OUTPATIENT
Start: 2022-05-26

## 2022-05-26 NOTE — TELEPHONE ENCOUNTER
Medication:   Requested Prescriptions     Pending Prescriptions Disp Refills    atorvastatin (LIPITOR) 20 MG tablet 90 tablet 3     Sig: Take 1 tablet by mouth daily          Patient Phone Number: [phone] (home)     Last appt: 4/19/2022   Next appt: Visit date not found    Last OARRS: No flowsheet data found.   PDMP Monitoring:    Last PDMP Jessica Warner as Reviewed [facility]):  Review User Review Instant Review Result          Preferred Pharmacy:   [address] Remington Moses [phone] - F [phone]  [address] [place] [id number]  Phone: [phone] Fax: [phone] [facility] Mail Delivery - Sierra Rhod[place]ito [phone] - F [phone]  [address] [place] [address]  Phone: [phone] Fax: [phone]

## 2022-05-26 NOTE — TELEPHONE ENCOUNTER
atorvastatin (LIPITOR) 20 MG tablet 90 tablet 3 4/19/2022     Sig - Route:  Take 1 tablet by mouth daily - Oral      Refill Requested    Patient states there are no pending refills available to him    [facility] mail order in chart    Please advise

## 2023-01-30 ENCOUNTER — TELEPHONE (OUTPATIENT)
Dept: FAMILY MEDICINE CLINIC | Age: 73
End: 2023-01-30

## 2023-01-30 NOTE — TELEPHONE ENCOUNTER
----- Message from lAicia Pack sent at 1/30/2023 12:49 PM EST -----  Subject: Appointment Request    Reason for Call: New Patient/New to Provider Appointment needed: New   Patient Request Appointment    QUESTIONS    Reason for appointment request? Other - [facility] Physical     Additional Information for Provider?  Patient is wanting to know if Dr. Kg Arthur will sign off on basic [address]  medical physical   ---------------------------------------------------------------------------  --------------  [address]  [phone]; OK to leave message on voicemail  ---------------------------------------------------------------------------  --------------  SCRIPT ANSWERS  COVID Screen: Rosiland Ormond

## 2023-01-30 NOTE — TELEPHONE ENCOUNTER
Dr. Moises Booth isn't in this office. There are multiple Dr Leonor Rivera in the system.   Routed to

## 2023-03-02 ENCOUNTER — TELEPHONE (OUTPATIENT)
Dept: FAMILY MEDICINE CLINIC | Age: 73
End: 2023-03-02

## 2023-03-02 DIAGNOSIS — N18.31 STAGE 3A CHRONIC KIDNEY DISEASE (HCC): ICD-10-CM

## 2023-03-02 DIAGNOSIS — E78.00 PURE HYPERCHOLESTEROLEMIA: Primary | ICD-10-CM

## 2023-03-02 NOTE — TELEPHONE ENCOUNTER
----- Message from Deon Cantu sent at 3/2/2023  3:30 PM EST -----  Subject: Referral Request    Reason for referral request? routine labs  Provider patient wants to be referred to(if known):     Provider Phone Number(if known): Additional Information for Provider?  Patient would like to have labs done   prior to appointment.  ---------------------------------------------------------------------------  --------------  [address] [facility]    [phone]; OK to leave message on voicemail  ---------------------------------------------------------------------------  --------------

## 2023-04-20 RX ORDER — ATORVASTATIN CALCIUM 20 MG/1
TABLET, FILM COATED ORAL
Qty: 90 TABLET | Refills: 3 | Status: SHIPPED | OUTPATIENT
Start: 2023-04-20

## 2023-04-20 NOTE — TELEPHONE ENCOUNTER
Medication:   Requested Prescriptions     Pending Prescriptions Disp Refills    atorvastatin (LIPITOR) 20 MG tablet [Pharmacy Med Name: ATORVASTATIN CALCIUM 20 MG Tablet] 90 tablet 3     Sig: TAKE 1 TABLET EVERY DAY          Patient Phone Number: [phone] (home)     Last appt: 4/19/2022   Next appt: 4/25/2023    Last OARRS: No flowsheet data found.   PDMP Monitoring:    Last PDMP Makeda Brooks as Reviewed [facility]):  Review User Review Instant Review Result          Preferred Pharmacy:   Kanwal Segura [address] [place] [address] Remington Moses [phone] - F [phone]  [address] [place] [address]  Phone: [phone] Fax: [phone] [facility] [facility] Mail Delivery - Sierra Rhodatito [phone] - F [phone]  [facility] [address]  Phone: [phone] Fax: [phone]

## 2023-04-25 ENCOUNTER — OFFICE VISIT (OUTPATIENT)
Dept: FAMILY MEDICINE CLINIC | Age: 73
End: 2023-04-25

## 2023-04-25 VITALS
WEIGHT: 215.6 LBS | BODY MASS INDEX: 29.2 KG/M2 | SYSTOLIC BLOOD PRESSURE: 130 MMHG | HEART RATE: 69 BPM | OXYGEN SATURATION: 98 % | HEIGHT: 72 IN | DIASTOLIC BLOOD PRESSURE: 88 MMHG | TEMPERATURE: 97.9 F

## 2023-04-25 DIAGNOSIS — Z00.00 WELL ADULT EXAM: Primary | ICD-10-CM

## 2023-04-25 DIAGNOSIS — N18.2 STAGE 2 CHRONIC KIDNEY DISEASE: ICD-10-CM

## 2023-04-25 DIAGNOSIS — E78.00 PURE HYPERCHOLESTEROLEMIA: ICD-10-CM

## 2023-04-25 SDOH — HEALTH STABILITY: PHYSICAL HEALTH: ON AVERAGE, HOW MANY MINUTES DO YOU ENGAGE IN EXERCISE AT THIS LEVEL?: 60 MIN

## 2023-04-25 SDOH — ECONOMIC STABILITY: INCOME INSECURITY: HOW HARD IS IT FOR YOU TO PAY FOR THE VERY BASICS LIKE FOOD, HOUSING, MEDICAL CARE, AND HEATING?: NOT HARD AT ALL

## 2023-04-25 SDOH — ECONOMIC STABILITY: FOOD INSECURITY: WITHIN THE PAST 12 MONTHS, THE FOOD YOU BOUGHT JUST DIDN'T LAST AND YOU DIDN'T HAVE MONEY TO GET MORE.: NEVER TRUE

## 2023-04-25 SDOH — HEALTH STABILITY: PHYSICAL HEALTH: ON AVERAGE, HOW MANY DAYS PER WEEK DO YOU ENGAGE IN MODERATE TO STRENUOUS EXERCISE (LIKE A BRISK WALK)?: 7 DAYS

## 2023-04-25 SDOH — ECONOMIC STABILITY: HOUSING INSECURITY
IN THE LAST 12 MONTHS, WAS THERE A TIME WHEN YOU DID NOT HAVE A STEADY PLACE TO SLEEP OR SLEPT IN A SHELTER (INCLUDING NOW)?: NO

## 2023-04-25 SDOH — ECONOMIC STABILITY: FOOD INSECURITY: WITHIN THE PAST 12 MONTHS, YOU WORRIED THAT YOUR FOOD WOULD RUN OUT BEFORE YOU GOT MONEY TO BUY MORE.: NEVER TRUE

## 2023-04-25 ASSESSMENT — PATIENT HEALTH QUESTIONNAIRE - PHQ9
SUM OF ALL RESPONSES TO PHQ QUESTIONS 1-9: 0
SUM OF ALL RESPONSES TO PHQ9 QUESTIONS 1 & 2: 0
SUM OF ALL RESPONSES TO PHQ QUESTIONS 1-9: 0
1. LITTLE INTEREST OR PLEASURE IN DOING THINGS: 0
2. FEELING DOWN, DEPRESSED OR HOPELESS: 0
SUM OF ALL RESPONSES TO PHQ QUESTIONS 1-9: 0
SUM OF ALL RESPONSES TO PHQ QUESTIONS 1-9: 0

## 2023-04-25 ASSESSMENT — LIFESTYLE VARIABLES
HOW MANY STANDARD DRINKS CONTAINING ALCOHOL DO YOU HAVE ON A TYPICAL DAY: 1
HOW MANY STANDARD DRINKS CONTAINING ALCOHOL DO YOU HAVE ON A TYPICAL DAY: 1 OR 2
HOW OFTEN DO YOU HAVE SIX OR MORE DRINKS ON ONE OCCASION: 1

## 2023-04-25 NOTE — PROGRESS NOTES
or more drinks on one occasion? Never Never Never           Care Team:  Patient's list of care team members was updated in EHR under the Snap Shot. [place] care  Dr Rajesh [place] - dentist  Dr. Helena Palomo - flight physicals      Immunizations: Reviewed with patient. Health Maintenance Due   Topic Date Due    COVID-19 Vaccine (5 - Booster for Moderna series) 06/06/2022       CHRONIC CONDITIONS / ACUTE COMPLAINTS     Taking chol medication reg, no SE      Having stress with daughter who is struggling with mental health issues. Physical Exam:    Body mass index is 29.24 kg/m². Vitals:    04/25/23 0836   BP: 130/88   Site: Left Upper Arm   Position: Sitting   Cuff Size: Medium Adult   Pulse: 69   Temp: 97.9 °F (36.6 °C)   TempSrc: Infrared   SpO2: 98%   Weight: 215 lb 9.6 oz (97.8 kg)   Height: 6' (1.829 m)     Wt Readings from Last 3 Encounters:   04/25/23 215 lb 9.6 oz (97.8 kg)   04/19/22 229 lb 12.8 oz (104.2 kg)   04/15/21 227 lb (103 kg)       GENERAL:Alert and oriented x 4 NAD, affect appropriate and overweight, well hydrated, well developed. LUNG:clear to auscultation bilaterally with normal respiratory effort  CV: Normal heart sounds, regular rate and rhythm without murmurs  EXTREMETY: no edema, normal pedal pulses bilaterally    Was the timed get up and go unsteady or longer than 20 seconds: No        Assessment/Plan:    Dayna Mathew was seen today for medicare awv. Diagnoses and all orders for this visit:    Well adult exam  Recommended screenings discussed and ordered if patient agreed  Recommended vaccinations discussed and ordered if patient agreed  Encouraged healthy diet   Encouraged regular exercise and maintaining a healthy weight    Medicare Safety Interventions: Home safety tips provided  Individualized [address] included in patient instructions and AVS    Pure hypercholesterolemia  -Stable, continue current medications.   Labs reviewed with pt and ok    Stage 2 chronic kidney

## 2023-09-04 ENCOUNTER — TELEPHONE (OUTPATIENT)
Dept: FAMILY MEDICINE CLINIC | Age: 73
End: 2023-09-04

## 2023-09-04 DIAGNOSIS — U07.1 COVID: Primary | ICD-10-CM

## 2023-09-04 NOTE — TELEPHONE ENCOUNTER
Patient returned call - covid positive. Will prescribe Paxlovid - patient to hold atorvastatin. Attempted to find a pharmacy that was open on Labor Day with Paxlovid in stock - unsuccessful. Will send to [address] - if they don't have in stock patient to call in am to have sent somewhere else.

## 2023-09-04 NOTE — TELEPHONE ENCOUNTER
Patient made call into on call service. Concerns for URI symptoms - sore throat, fevers that started on Saturday. He has not taken a Covid test, but he does have one at home. Advised to test - call me back if he's positive, otherwise treat the symptoms. Can see him tomorrow if he is still not feeling well.

## 2024-04-14 RX ORDER — ATORVASTATIN CALCIUM 20 MG/1
TABLET, FILM COATED ORAL
Qty: 90 TABLET | Refills: 3 | Status: SHIPPED | OUTPATIENT
Start: 2024-04-14

## 2024-04-29 ENCOUNTER — TELEPHONE (OUTPATIENT)
Dept: FAMILY MEDICINE CLINIC | Age: 74
End: 2024-04-29

## 2024-04-29 DIAGNOSIS — Z00.00 WELL ADULT EXAM: Primary | ICD-10-CM

## 2024-04-29 DIAGNOSIS — N18.2 STAGE 2 CHRONIC KIDNEY DISEASE: ICD-10-CM

## 2024-04-29 DIAGNOSIS — E78.00 PURE HYPERCHOLESTEROLEMIA: ICD-10-CM

## 2024-04-29 NOTE — TELEPHONE ENCOUNTER
Confused - why is he scheduled for next year???  He is due now.  He needs Lipid and CMP now. I can't even order for next year as the orders will  before then

## 2024-04-29 NOTE — TELEPHONE ENCOUNTER
Pt came in to reschedule AWV for next year. Would like to request blood work.     Please advise...

## 2024-04-29 NOTE — TELEPHONE ENCOUNTER
Tell pt that I will not refill his medication if he doesn't make an appt.     I am sorry he went to lab and no orders in but we do not know to put the orders in unless he reaches out to us to let us know he has an upcoming appt. We do not get a notification when he makes an appt, it is his responsibility to let us know if he wants to get his labs done before the appt. Lab orders  so we can not put them in a year out.

## 2024-04-29 NOTE — TELEPHONE ENCOUNTER
Patient states that he went to get blood work on Saturday to make it for his visit.  States that since orders weren't in the system, he will get the blood work done now, but is still going to wait for his visit next year to come in.  Said that there is no point to come in if blood work not done, but still not willing to come in before the appointment next year.  He will keep the appointment that he made.   Wanting MD to know that he isn't happy with process.    Orders placed for CMP and lipids.

## 2025-02-03 RX ORDER — ATORVASTATIN CALCIUM 20 MG/1
TABLET, FILM COATED ORAL
Qty: 90 TABLET | Refills: 3 | Status: SHIPPED | OUTPATIENT
Start: 2025-02-03

## 2025-02-03 NOTE — TELEPHONE ENCOUNTER
Medication:   Requested Prescriptions     Pending Prescriptions Disp Refills    atorvastatin (LIPITOR) 20 MG tablet [Pharmacy Med Name: Atorvastatin Calcium Oral Tablet 20 MG] 90 tablet 3     Sig: TAKE 1 TABLET EVERY DAY          Patient Phone Number: [phone] (home)     Last appt: 4/25/2023   Next appt: 3/3/2025    Last OARRS:        No data to display              PDMP Monitoring:    Last PDMP [place] as Reviewed ([place]):  Review User Review Instant Review Result          Preferred Pharmacy:   [id number] - [place], [place] - [facility] [address] - P [phone] - F [phone]  [facility] [address]  [facility] [address]  Phone: [phone] Fax: [phone]    [facility] - [facility] [address] - P [phone] - F [phone]  [address]  [facility] [address]  Phone: [phone] Fax: [phone]

## 2025-02-11 ENCOUNTER — HOSPITAL ENCOUNTER (OUTPATIENT)
Age: 75
Discharge: HOME | End: 2025-02-11
Payer: MEDICARE

## 2025-02-11 DIAGNOSIS — E78.00 PURE HYPERCHOLESTEROLEMIA: ICD-10-CM

## 2025-02-11 DIAGNOSIS — N18.2 STAGE 2 CHRONIC KIDNEY DISEASE: ICD-10-CM

## 2025-02-11 DIAGNOSIS — Z00.00 WELL ADULT EXAM: ICD-10-CM

## 2025-02-11 LAB
ALBUMIN SERPL-MCNC: 4.1 G/DL (ref 3.4–5)
ALBUMIN/GLOB SERPL: 1.5 {RATIO} (ref 1.1–2.2)
ALP SERPL-CCNC: 94 U/L (ref 40–129)
ALT SERPL-CCNC: 21 U/L (ref 10–40)
ANION GAP SERPL CALCULATED.3IONS-SCNC: 9 MMOL/L (ref 3–16)
AST SERPL-CCNC: 27 U/L (ref 15–37)
BILIRUB SERPL-MCNC: 0.6 MG/DL (ref 0–1)
BUN SERPL-MCNC: 16 MG/DL (ref 7–20)
CALCIUM SERPL-MCNC: 9.4 MG/DL (ref 8.3–10.6)
CHLORIDE SERPL-SCNC: 104 MMOL/L (ref 99–110)
CHOLEST SERPL-MCNC: 135 MG/DL (ref 0–199)
CO2 SERPL-SCNC: 26 MMOL/L (ref 21–32)
CREAT SERPL-MCNC: 1 MG/DL (ref 0.8–1.3)
GFR SERPLBLD CREATININE-BSD FMLA CKD-EPI: 78 ML/MIN/{1.73_M2}
GLUCOSE SERPL-MCNC: 88 MG/DL (ref 70–99)
HDLC SERPL-MCNC: 43 MG/DL (ref 40–60)
LDLC SERPL CALC-MCNC: 81 MG/DL
POTASSIUM SERPL-SCNC: 4.3 MMOL/L (ref 3.5–5.1)
PROT SERPL-MCNC: 6.8 G/DL (ref 6.4–8.2)
SODIUM SERPL-SCNC: 139 MMOL/L (ref 136–145)
TRIGL SERPL-MCNC: 56 MG/DL (ref 0–150)
VLDLC SERPL CALC-MCNC: 11 MG/DL

## 2025-02-11 PROCEDURE — 80053 COMPREHEN METABOLIC PANEL: CPT

## 2025-02-11 PROCEDURE — 80061 LIPID PANEL: CPT

## 2025-02-11 PROCEDURE — 36415 COLL VENOUS BLD VENIPUNCTURE: CPT

## 2025-02-28 SDOH — ECONOMIC STABILITY: FOOD INSECURITY: WITHIN THE PAST 12 MONTHS, THE FOOD YOU BOUGHT JUST DIDN'T LAST AND YOU DIDN'T HAVE MONEY TO GET MORE.: NEVER TRUE

## 2025-02-28 SDOH — HEALTH STABILITY: PHYSICAL HEALTH: ON AVERAGE, HOW MANY DAYS PER WEEK DO YOU ENGAGE IN MODERATE TO STRENUOUS EXERCISE (LIKE A BRISK WALK)?: 5 DAYS

## 2025-02-28 SDOH — ECONOMIC STABILITY: INCOME INSECURITY: IN THE LAST 12 MONTHS, WAS THERE A TIME WHEN YOU WERE NOT ABLE TO PAY THE MORTGAGE OR RENT ON TIME?: NO

## 2025-02-28 SDOH — ECONOMIC STABILITY: FOOD INSECURITY: WITHIN THE PAST 12 MONTHS, YOU WORRIED THAT YOUR FOOD WOULD RUN OUT BEFORE YOU GOT MONEY TO BUY MORE.: NEVER TRUE

## 2025-02-28 SDOH — ECONOMIC STABILITY: TRANSPORTATION INSECURITY
IN THE PAST 12 MONTHS, HAS THE LACK OF TRANSPORTATION KEPT YOU FROM MEDICAL APPOINTMENTS OR FROM GETTING MEDICATIONS?: NO

## 2025-02-28 SDOH — HEALTH STABILITY: PHYSICAL HEALTH: ON AVERAGE, HOW MANY MINUTES DO YOU ENGAGE IN EXERCISE AT THIS LEVEL?: 60 MIN

## 2025-02-28 SDOH — ECONOMIC STABILITY: TRANSPORTATION INSECURITY
IN THE PAST 12 MONTHS, HAS LACK OF TRANSPORTATION KEPT YOU FROM MEETINGS, WORK, OR FROM GETTING THINGS NEEDED FOR DAILY LIVING?: NO

## 2025-02-28 ASSESSMENT — PATIENT HEALTH QUESTIONNAIRE - PHQ9
SUM OF ALL RESPONSES TO PHQ QUESTIONS 1-9: 0
2. FEELING DOWN, DEPRESSED OR HOPELESS: NOT AT ALL
SUM OF ALL RESPONSES TO PHQ QUESTIONS 1-9: 0
1. LITTLE INTEREST OR PLEASURE IN DOING THINGS: NOT AT ALL
SUM OF ALL RESPONSES TO PHQ QUESTIONS 1-9: 0
SUM OF ALL RESPONSES TO PHQ QUESTIONS 1-9: 0

## 2025-02-28 ASSESSMENT — LIFESTYLE VARIABLES
HOW MANY STANDARD DRINKS CONTAINING ALCOHOL DO YOU HAVE ON A TYPICAL DAY: 1
HOW OFTEN DO YOU HAVE SIX OR MORE DRINKS ON ONE OCCASION: 1
HOW OFTEN DO YOU HAVE A DRINK CONTAINING ALCOHOL: 2
HOW OFTEN DO YOU HAVE A DRINK CONTAINING ALCOHOL: MONTHLY OR LESS
HOW MANY STANDARD DRINKS CONTAINING ALCOHOL DO YOU HAVE ON A TYPICAL DAY: 1 OR 2

## 2025-03-03 ENCOUNTER — OFFICE VISIT (OUTPATIENT)
Dept: FAMILY MEDICINE CLINIC | Age: 75
End: 2025-03-03

## 2025-03-03 VITALS
TEMPERATURE: 98 F | DIASTOLIC BLOOD PRESSURE: 80 MMHG | SYSTOLIC BLOOD PRESSURE: 110 MMHG | WEIGHT: 205.4 LBS | BODY MASS INDEX: 27.82 KG/M2 | HEART RATE: 79 BPM | OXYGEN SATURATION: 98 % | HEIGHT: 72 IN

## 2025-03-03 DIAGNOSIS — E78.00 PURE HYPERCHOLESTEROLEMIA: ICD-10-CM

## 2025-03-03 DIAGNOSIS — N18.2 STAGE 2 CHRONIC KIDNEY DISEASE: ICD-10-CM

## 2025-03-03 DIAGNOSIS — Z00.00 WELL ADULT EXAM: Primary | ICD-10-CM

## 2025-03-03 PROBLEM — N18.31 STAGE 3A CHRONIC KIDNEY DISEASE (HCC): Status: ACTIVE | Noted: 2025-03-03

## 2025-03-03 PROBLEM — N18.31 STAGE 3A CHRONIC KIDNEY DISEASE (HCC): Status: RESOLVED | Noted: 2025-03-03 | Resolved: 2025-03-03

## 2025-03-03 NOTE — PATIENT INSTRUCTIONS
Personalized Preventative Plan for Sreedhar AUSTIN [employer] offers a range of preventative health benefits. Some of the tests and screenings are paid in full while others may be subject to a deductible, co-insurance and/or copay. Some of these benefits include a comprehensive review of your medical history including lifestyle, illnesses that may run in your family and various assessments and screenings as appropriate. After reviewing your medical record and screening and assessments performed today your provider may have ordered/recommended immunizations, labs, imaging and/or referrals for you. A list of these orders as well as your Preventative Care list are included within your After Visit Summary for your review.

## 2025-08-13 ENCOUNTER — OFFICE VISIT (OUTPATIENT)
Dept: FAMILY MEDICINE CLINIC | Age: 75
End: 2025-08-13
Payer: MEDICARE

## 2025-08-13 VITALS
HEIGHT: 72 IN | OXYGEN SATURATION: 96 % | WEIGHT: 212 LBS | TEMPERATURE: 97.6 F | RESPIRATION RATE: 15 BRPM | DIASTOLIC BLOOD PRESSURE: 82 MMHG | HEART RATE: 76 BPM | SYSTOLIC BLOOD PRESSURE: 130 MMHG | BODY MASS INDEX: 28.71 KG/M2

## 2025-08-13 DIAGNOSIS — E78.00 PURE HYPERCHOLESTEROLEMIA: Primary | ICD-10-CM

## 2025-08-13 DIAGNOSIS — N18.2 STAGE 2 CHRONIC KIDNEY DISEASE: ICD-10-CM

## 2025-08-13 LAB
ALBUMIN SERPL-MCNC: 4.5 G/DL (ref 3.4–5)
ALBUMIN/GLOB SERPL: 2 {RATIO} (ref 1.1–2.2)
ALP SERPL-CCNC: 106 U/L (ref 40–129)
ALT SERPL-CCNC: 28 U/L (ref 10–40)
ANION GAP SERPL CALCULATED.3IONS-SCNC: 12 MMOL/L (ref 3–16)
AST SERPL-CCNC: 26 U/L (ref 15–37)
BILIRUB SERPL-MCNC: 0.5 MG/DL (ref 0–1)
BUN SERPL-MCNC: 17 MG/DL (ref 7–20)
CALCIUM SERPL-MCNC: 9.9 MG/DL (ref 8.3–10.6)
CHLORIDE SERPL-SCNC: 107 MMOL/L (ref 99–110)
CO2 SERPL-SCNC: 26 MMOL/L (ref 21–32)
CREAT SERPL-MCNC: 1.2 MG/DL (ref 0.8–1.3)
GFR SERPLBLD CREATININE-BSD FMLA CKD-EPI: 63 ML/MIN/{1.73_M2}
GLUCOSE SERPL-MCNC: 81 MG/DL (ref 70–99)
POTASSIUM SERPL-SCNC: 5 MMOL/L (ref 3.5–5.1)
PROT SERPL-MCNC: 6.7 G/DL (ref 6.4–8.2)
SODIUM SERPL-SCNC: 145 MMOL/L (ref 136–145)

## 2025-08-13 PROCEDURE — G8427 DOCREV CUR MEDS BY ELIG CLIN: HCPCS | Performed by: FAMILY MEDICINE

## 2025-08-13 PROCEDURE — G8419 CALC BMI OUT NRM PARAM NOF/U: HCPCS | Performed by: FAMILY MEDICINE

## 2025-08-13 PROCEDURE — 1159F MED LIST DOCD IN RCRD: CPT | Performed by: FAMILY MEDICINE

## 2025-08-13 PROCEDURE — 99213 OFFICE O/P EST LOW 20 MIN: CPT | Performed by: FAMILY MEDICINE

## 2025-08-13 PROCEDURE — 1036F TOBACCO NON-USER: CPT | Performed by: FAMILY MEDICINE

## 2025-08-13 PROCEDURE — 1123F ACP DISCUSS/DSCN MKR DOCD: CPT | Performed by: FAMILY MEDICINE

## 2025-08-13 PROCEDURE — G2211 COMPLEX E/M VISIT ADD ON: HCPCS | Performed by: FAMILY MEDICINE

## 2025-08-13 PROCEDURE — 36415 COLL VENOUS BLD VENIPUNCTURE: CPT | Performed by: FAMILY MEDICINE

## 2025-08-13 PROCEDURE — 3017F COLORECTAL CA SCREEN DOC REV: CPT | Performed by: FAMILY MEDICINE
